# Patient Record
Sex: FEMALE | Race: WHITE | Employment: FULL TIME | URBAN - METROPOLITAN AREA
[De-identification: names, ages, dates, MRNs, and addresses within clinical notes are randomized per-mention and may not be internally consistent; named-entity substitution may affect disease eponyms.]

---

## 2017-02-07 LAB
A/G RATIO: 1.8 (ref 1.1–2.2)
ALBUMIN SERPL-MCNC: 4.2 G/DL (ref 3.4–5)
ALP BLD-CCNC: 59 U/L (ref 40–129)
ALT SERPL-CCNC: 21 U/L (ref 10–40)
ANION GAP SERPL CALCULATED.3IONS-SCNC: 13 MMOL/L (ref 3–16)
AST SERPL-CCNC: 18 U/L (ref 15–37)
BASOPHILS ABSOLUTE: 0.1 K/UL (ref 0–0.2)
BASOPHILS RELATIVE PERCENT: 1.2 %
BILIRUB SERPL-MCNC: <0.2 MG/DL (ref 0–1)
BUN BLDV-MCNC: 15 MG/DL (ref 7–20)
CALCIUM SERPL-MCNC: 8.9 MG/DL (ref 8.3–10.6)
CHLORIDE BLD-SCNC: 100 MMOL/L (ref 99–110)
CO2: 26 MMOL/L (ref 21–32)
CREAT SERPL-MCNC: 0.7 MG/DL (ref 0.6–1.1)
EOSINOPHILS ABSOLUTE: 0.1 K/UL (ref 0–0.6)
EOSINOPHILS RELATIVE PERCENT: 1.5 %
GFR AFRICAN AMERICAN: >60
GFR NON-AFRICAN AMERICAN: >60
GLOBULIN: 2.3 G/DL
GLUCOSE BLD-MCNC: 85 MG/DL (ref 70–99)
HCT VFR BLD CALC: 37.2 % (ref 36–48)
HEMOGLOBIN: 12.6 G/DL (ref 12–16)
LYMPHOCYTES ABSOLUTE: 1.4 K/UL (ref 1–5.1)
LYMPHOCYTES RELATIVE PERCENT: 25 %
MCH RBC QN AUTO: 30.9 PG (ref 26–34)
MCHC RBC AUTO-ENTMCNC: 33.7 G/DL (ref 31–36)
MCV RBC AUTO: 91.6 FL (ref 80–100)
MONOCYTES ABSOLUTE: 0.3 K/UL (ref 0–1.3)
MONOCYTES RELATIVE PERCENT: 4.7 %
NEUTROPHILS ABSOLUTE: 3.8 K/UL (ref 1.7–7.7)
NEUTROPHILS RELATIVE PERCENT: 67.6 %
PDW BLD-RTO: 14.4 % (ref 12.4–15.4)
PLATELET # BLD: 148 K/UL (ref 135–450)
PMV BLD AUTO: 9.9 FL (ref 5–10.5)
POTASSIUM SERPL-SCNC: 3.9 MMOL/L (ref 3.5–5.1)
RBC # BLD: 4.06 M/UL (ref 4–5.2)
SODIUM BLD-SCNC: 139 MMOL/L (ref 136–145)
TOTAL PROTEIN: 6.5 G/DL (ref 6.4–8.2)
WBC # BLD: 5.7 K/UL (ref 4–11)

## 2017-03-07 ENCOUNTER — OFFICE VISIT (OUTPATIENT)
Dept: RHEUMATOLOGY | Age: 22
End: 2017-03-07

## 2017-03-07 VITALS
HEART RATE: 86 BPM | DIASTOLIC BLOOD PRESSURE: 70 MMHG | BODY MASS INDEX: 25.54 KG/M2 | TEMPERATURE: 98 F | WEIGHT: 168 LBS | SYSTOLIC BLOOD PRESSURE: 124 MMHG

## 2017-03-07 DIAGNOSIS — R12 HEART BURN: ICD-10-CM

## 2017-03-07 DIAGNOSIS — L40.50 PSORIATIC ARTHRITIS (HCC): Primary | ICD-10-CM

## 2017-03-07 DIAGNOSIS — L40.9 PSORIASIS: ICD-10-CM

## 2017-03-07 DIAGNOSIS — K21.9 GASTROESOPHAGEAL REFLUX DISEASE, ESOPHAGITIS PRESENCE NOT SPECIFIED: Primary | ICD-10-CM

## 2017-03-07 DIAGNOSIS — Z51.81 ENCOUNTER FOR METHOTREXATE MONITORING: ICD-10-CM

## 2017-03-07 DIAGNOSIS — R76.8 POSITIVE ANA (ANTINUCLEAR ANTIBODY): ICD-10-CM

## 2017-03-07 DIAGNOSIS — Z79.899 ENCOUNTER FOR METHOTREXATE MONITORING: ICD-10-CM

## 2017-03-07 DIAGNOSIS — F17.200 SMOKING: ICD-10-CM

## 2017-03-07 PROCEDURE — 99214 OFFICE O/P EST MOD 30 MIN: CPT | Performed by: INTERNAL MEDICINE

## 2017-03-07 RX ORDER — OMEPRAZOLE 20 MG/1
20 CAPSULE, DELAYED RELEASE ORAL DAILY
Qty: 30 CAPSULE | Refills: 3 | Status: SHIPPED | OUTPATIENT
Start: 2017-03-07 | End: 2021-06-18

## 2017-03-07 RX ORDER — OMEPRAZOLE 20 MG/1
20 CAPSULE, DELAYED RELEASE ORAL DAILY
Qty: 30 CAPSULE | Refills: 5 | Status: SHIPPED | OUTPATIENT
Start: 2017-03-07 | End: 2017-08-17 | Stop reason: SDUPTHER

## 2017-04-04 DIAGNOSIS — L40.50 PSORIATIC ARTHRITIS (HCC): ICD-10-CM

## 2017-04-27 DIAGNOSIS — L40.50 PSORIATIC ARTHRITIS (HCC): ICD-10-CM

## 2017-06-19 DIAGNOSIS — Z51.81 ENCOUNTER FOR METHOTREXATE MONITORING: ICD-10-CM

## 2017-06-19 DIAGNOSIS — L40.50 PSORIATIC ARTHRITIS (HCC): ICD-10-CM

## 2017-06-19 DIAGNOSIS — Z79.631 ENCOUNTER FOR METHOTREXATE MONITORING: ICD-10-CM

## 2017-06-19 LAB
A/G RATIO: 1.6 (ref 1.1–2.2)
ALBUMIN SERPL-MCNC: 4.3 G/DL (ref 3.4–5)
ALP BLD-CCNC: 71 U/L (ref 40–129)
ALT SERPL-CCNC: 15 U/L (ref 10–40)
ANION GAP SERPL CALCULATED.3IONS-SCNC: 13 MMOL/L (ref 3–16)
AST SERPL-CCNC: 17 U/L (ref 15–37)
BASOPHILS ABSOLUTE: 0 K/UL (ref 0–0.2)
BASOPHILS RELATIVE PERCENT: 0.5 %
BILIRUB SERPL-MCNC: 0.3 MG/DL (ref 0–1)
BUN BLDV-MCNC: 15 MG/DL (ref 7–20)
CALCIUM SERPL-MCNC: 9.1 MG/DL (ref 8.3–10.6)
CHLORIDE BLD-SCNC: 98 MMOL/L (ref 99–110)
CO2: 24 MMOL/L (ref 21–32)
CREAT SERPL-MCNC: 0.7 MG/DL (ref 0.6–1.1)
EOSINOPHILS ABSOLUTE: 0.1 K/UL (ref 0–0.6)
EOSINOPHILS RELATIVE PERCENT: 1.5 %
GFR AFRICAN AMERICAN: >60
GFR NON-AFRICAN AMERICAN: >60
GLOBULIN: 2.7 G/DL
GLUCOSE BLD-MCNC: 69 MG/DL (ref 70–99)
HCT VFR BLD CALC: 37.7 % (ref 36–48)
HEMOGLOBIN: 12.5 G/DL (ref 12–16)
LYMPHOCYTES ABSOLUTE: 1.6 K/UL (ref 1–5.1)
LYMPHOCYTES RELATIVE PERCENT: 28.3 %
MCH RBC QN AUTO: 30.4 PG (ref 26–34)
MCHC RBC AUTO-ENTMCNC: 33 G/DL (ref 31–36)
MCV RBC AUTO: 92 FL (ref 80–100)
MONOCYTES ABSOLUTE: 0.3 K/UL (ref 0–1.3)
MONOCYTES RELATIVE PERCENT: 4.5 %
NEUTROPHILS ABSOLUTE: 3.7 K/UL (ref 1.7–7.7)
NEUTROPHILS RELATIVE PERCENT: 65.2 %
PDW BLD-RTO: 14.3 % (ref 12.4–15.4)
PLATELET # BLD: 152 K/UL (ref 135–450)
PMV BLD AUTO: 9.6 FL (ref 5–10.5)
POTASSIUM SERPL-SCNC: 4.2 MMOL/L (ref 3.5–5.1)
RBC # BLD: 4.1 M/UL (ref 4–5.2)
SODIUM BLD-SCNC: 135 MMOL/L (ref 136–145)
TOTAL PROTEIN: 7 G/DL (ref 6.4–8.2)
WBC # BLD: 5.7 K/UL (ref 4–11)

## 2017-08-17 ENCOUNTER — OFFICE VISIT (OUTPATIENT)
Dept: RHEUMATOLOGY | Age: 22
End: 2017-08-17

## 2017-08-17 VITALS
DIASTOLIC BLOOD PRESSURE: 68 MMHG | WEIGHT: 159.8 LBS | HEIGHT: 68 IN | SYSTOLIC BLOOD PRESSURE: 107 MMHG | BODY MASS INDEX: 24.22 KG/M2 | OXYGEN SATURATION: 98 % | HEART RATE: 92 BPM

## 2017-08-17 DIAGNOSIS — R12 HEART BURN: ICD-10-CM

## 2017-08-17 DIAGNOSIS — R76.8 POSITIVE ANA (ANTINUCLEAR ANTIBODY): ICD-10-CM

## 2017-08-17 DIAGNOSIS — Z51.81 ENCOUNTER FOR METHOTREXATE MONITORING: ICD-10-CM

## 2017-08-17 DIAGNOSIS — L40.9 PSORIASIS: ICD-10-CM

## 2017-08-17 DIAGNOSIS — Z79.899 ENCOUNTER FOR METHOTREXATE MONITORING: ICD-10-CM

## 2017-08-17 DIAGNOSIS — Z79.631 ENCOUNTER FOR METHOTREXATE MONITORING: ICD-10-CM

## 2017-08-17 DIAGNOSIS — L40.50 PSORIATIC ARTHRITIS (HCC): Primary | ICD-10-CM

## 2017-08-17 LAB
A/G RATIO: 1.8 (ref 1.1–2.2)
ALBUMIN SERPL-MCNC: 4.6 G/DL (ref 3.4–5)
ALP BLD-CCNC: 76 U/L (ref 40–129)
ALT SERPL-CCNC: 37 U/L (ref 10–40)
ANION GAP SERPL CALCULATED.3IONS-SCNC: 17 MMOL/L (ref 3–16)
AST SERPL-CCNC: 30 U/L (ref 15–37)
BASOPHILS ABSOLUTE: 0 K/UL (ref 0–0.2)
BASOPHILS RELATIVE PERCENT: 0.7 %
BILIRUB SERPL-MCNC: 0.3 MG/DL (ref 0–1)
BUN BLDV-MCNC: 9 MG/DL (ref 7–20)
CALCIUM SERPL-MCNC: 9.2 MG/DL (ref 8.3–10.6)
CHLORIDE BLD-SCNC: 100 MMOL/L (ref 99–110)
CO2: 25 MMOL/L (ref 21–32)
CREAT SERPL-MCNC: 0.8 MG/DL (ref 0.6–1.1)
EOSINOPHILS ABSOLUTE: 0.2 K/UL (ref 0–0.6)
EOSINOPHILS RELATIVE PERCENT: 3 %
GFR AFRICAN AMERICAN: >60
GFR NON-AFRICAN AMERICAN: >60
GLOBULIN: 2.6 G/DL
GLUCOSE BLD-MCNC: 78 MG/DL (ref 70–99)
HCT VFR BLD CALC: 41.9 % (ref 36–48)
HEMOGLOBIN: 14.1 G/DL (ref 12–16)
LYMPHOCYTES ABSOLUTE: 1.4 K/UL (ref 1–5.1)
LYMPHOCYTES RELATIVE PERCENT: 22.1 %
MCH RBC QN AUTO: 30.6 PG (ref 26–34)
MCHC RBC AUTO-ENTMCNC: 33.8 G/DL (ref 31–36)
MCV RBC AUTO: 90.7 FL (ref 80–100)
MONOCYTES ABSOLUTE: 0.3 K/UL (ref 0–1.3)
MONOCYTES RELATIVE PERCENT: 4.7 %
NEUTROPHILS ABSOLUTE: 4.3 K/UL (ref 1.7–7.7)
NEUTROPHILS RELATIVE PERCENT: 69.5 %
PDW BLD-RTO: 13.5 % (ref 12.4–15.4)
PLATELET # BLD: 147 K/UL (ref 135–450)
PMV BLD AUTO: 9.6 FL (ref 5–10.5)
POTASSIUM SERPL-SCNC: 4.7 MMOL/L (ref 3.5–5.1)
RBC # BLD: 4.62 M/UL (ref 4–5.2)
SODIUM BLD-SCNC: 142 MMOL/L (ref 136–145)
TOTAL PROTEIN: 7.2 G/DL (ref 6.4–8.2)
WBC # BLD: 6.2 K/UL (ref 4–11)

## 2017-08-17 PROCEDURE — 99214 OFFICE O/P EST MOD 30 MIN: CPT | Performed by: INTERNAL MEDICINE

## 2017-08-17 RX ORDER — FOLIC ACID 1 MG/1
1 TABLET ORAL DAILY
Qty: 30 TABLET | Refills: 5 | Status: SHIPPED | OUTPATIENT
Start: 2017-08-17 | End: 2021-06-18

## 2017-08-17 RX ORDER — FOLIC ACID 1 MG/1
1 TABLET ORAL DAILY
Qty: 30 TABLET | Refills: 5 | Status: SHIPPED | OUTPATIENT
Start: 2017-08-17 | End: 2017-08-17 | Stop reason: SDUPTHER

## 2017-08-17 RX ORDER — OMEPRAZOLE 20 MG/1
20 CAPSULE, DELAYED RELEASE ORAL DAILY
Qty: 30 CAPSULE | Refills: 5 | Status: SHIPPED | OUTPATIENT
Start: 2017-08-17 | End: 2017-08-17 | Stop reason: SDUPTHER

## 2017-08-17 RX ORDER — OMEPRAZOLE 20 MG/1
20 CAPSULE, DELAYED RELEASE ORAL DAILY
Qty: 30 CAPSULE | Refills: 5 | Status: SHIPPED | OUTPATIENT
Start: 2017-08-17 | End: 2018-01-24 | Stop reason: SDUPTHER

## 2017-11-07 ENCOUNTER — TELEPHONE (OUTPATIENT)
Dept: INTERNAL MEDICINE CLINIC | Age: 22
End: 2017-11-07

## 2017-11-07 DIAGNOSIS — L40.50 PSORIATIC ARTHRITIS (HCC): ICD-10-CM

## 2017-11-22 ENCOUNTER — OFFICE VISIT (OUTPATIENT)
Dept: RHEUMATOLOGY | Age: 22
End: 2017-11-22

## 2017-11-22 VITALS
DIASTOLIC BLOOD PRESSURE: 70 MMHG | WEIGHT: 170.2 LBS | HEIGHT: 68 IN | BODY MASS INDEX: 25.79 KG/M2 | SYSTOLIC BLOOD PRESSURE: 102 MMHG

## 2017-11-22 DIAGNOSIS — L40.50 PSORIATIC ARTHRITIS (HCC): Primary | ICD-10-CM

## 2017-11-22 DIAGNOSIS — Z79.899 HIGH RISK MEDICATION USE: ICD-10-CM

## 2017-11-22 DIAGNOSIS — R76.8 POSITIVE ANA (ANTINUCLEAR ANTIBODY): ICD-10-CM

## 2017-11-22 DIAGNOSIS — L40.9 PSORIASIS: ICD-10-CM

## 2017-11-22 LAB
ALT SERPL-CCNC: 17 U/L (ref 10–40)
AST SERPL-CCNC: 18 U/L (ref 15–37)
BASOPHILS ABSOLUTE: 0 K/UL (ref 0–0.2)
BASOPHILS RELATIVE PERCENT: 0.6 %
CREAT SERPL-MCNC: 0.7 MG/DL (ref 0.6–1.1)
EOSINOPHILS ABSOLUTE: 0.1 K/UL (ref 0–0.6)
EOSINOPHILS RELATIVE PERCENT: 2.1 %
GFR AFRICAN AMERICAN: >60
GFR NON-AFRICAN AMERICAN: >60
HCT VFR BLD CALC: 37.4 % (ref 36–48)
HEMOGLOBIN: 12.7 G/DL (ref 12–16)
LYMPHOCYTES ABSOLUTE: 1 K/UL (ref 1–5.1)
LYMPHOCYTES RELATIVE PERCENT: 25.5 %
MCH RBC QN AUTO: 31 PG (ref 26–34)
MCHC RBC AUTO-ENTMCNC: 33.9 G/DL (ref 31–36)
MCV RBC AUTO: 91.5 FL (ref 80–100)
MONOCYTES ABSOLUTE: 0.2 K/UL (ref 0–1.3)
MONOCYTES RELATIVE PERCENT: 5 %
NEUTROPHILS ABSOLUTE: 2.5 K/UL (ref 1.7–7.7)
NEUTROPHILS RELATIVE PERCENT: 66.8 %
PDW BLD-RTO: 14.9 % (ref 12.4–15.4)
PLATELET # BLD: 159 K/UL (ref 135–450)
PMV BLD AUTO: 9.5 FL (ref 5–10.5)
RBC # BLD: 4.08 M/UL (ref 4–5.2)
WBC # BLD: 3.8 K/UL (ref 4–11)

## 2017-11-22 PROCEDURE — 99214 OFFICE O/P EST MOD 30 MIN: CPT | Performed by: INTERNAL MEDICINE

## 2017-11-22 NOTE — PROGRESS NOTES
2017  Patient Name: Erica Kamara  : 1995  Medical Record: [id number]    MEDICATIONS  Current Outpatient Prescriptions   Medication Sig Dispense Refill    methotrexate (RHEUMATREX) 2.5 MG chemo tablet Take 8 tablets by mouth every 7 days 96 tablet 1    folic acid (FOLVITE) 1 MG tablet Take 1 tablet by mouth daily 30 tablet 5    omeprazole (PRILOSEC) 20 MG delayed release capsule Take 1 capsule by mouth Daily 30 capsule 5    Melatonin 1 MG CAPS Take by mouth      norethindrone-ethinyl estradiol (JUNEL ) 1-20 MG-MCG per tablet Take 1 tablet by mouth daily      prazosin (MINIPRESS) 1 MG capsule Take 2 mg by mouth three times daily       OXcarbazepine (TRILEPTAL) 300 MG tablet Take 300 mg by mouth 2 times daily      traZODone (DESYREL) 50 MG tablet Take 50 mg by mouth nightly       No current facility-administered medications for this visit. ALLERGIES  Allergies   Allergen Reactions    Diflucan [Fluconazole] Other (See Comments)     Mouth lesions in and around mouth         Comments  No specialty comments available. Background history:  Erica Kamara is a 23 y.o. female who is being seen for follow up evaluation of  psoriatic arthritis. The patient complains of bilateral foot pain for 1 year. Patient reports: Pain: none  Description: moderate  Nature: sharp  Location: heel  Radiates: distally and proximally   Exacerbating factors: weight bearing, walking  and climbing stairs  Night pain: occasional  Rest pain:  occasional  Onset: sudden  Alleviating factors: OTC NSAIDS (naproxen which helps)  Associated symptoms: swelling in the ankle and in the midfoot   Neurological complaints: none  Vascular complaints:  dependent swelling  Ambulation:  mild difficulty with walking   Previous treatments: steroid injection for plantar fascitis for right foot 7 months ago which did help a lot. She has psoriasis for past 1 year and was given topicals which have not worked.  She has pain at the achilles tendon site, occasional swelling as well. Interim history: She presents for a follow-up of psoriatic arthritis. She is on methotrexate 8 tablets per week and doing well. She gets occasional achiness in the joints towards end of the day after standing on her feet for 8 hours. She denies any swelling or stiffness. Symptoms are worse in her lower back. She denies any side effects with the medications. Psoriatic rash is improved    She denies dactylitis, uveitis. No recent fevers or infections. No malar rash, photosensitivity, oral/nasal ulcers, alopecia, pleuritis/pericarditis      HPI  ROS      REVIEW OF SYSTEMS: Positive for heart burn, dry eyes   Constitutional: No unanticipated weight loss or fevers. No fatigue and malaise. Integumentary: No photosensitivity, livedo reticularis, alopecia and Raynaud's symptoms, sclerodactyly, skin tightening  Eyes: negative for dry eyes, visual disturbance and persistent redness, discharge from eyes   ENT: - No tinnitus, loss of hearing, vertigo, or recurrent ear infections.  - No history of nasal/oral ulcers. - No history of sicca symptoms. Cardiovascular: No history of pericarditis, chest pain or murmur or palpitations  Respiratory: No shortness of breath, cough or history of interstitial lung disease. No history of pleurisy. No history of tuberculosis or atypical infections. Gastrointestinal: No history of heart burn, dysphagia or esophageal dysmotility. No change in bowel habits or any inflammatory bowel disease. Genitourinary: No history renal disease, miscarriages. Hematologic/Lymphatic: No abnormal bruising or bleeding, blood clots or swollen lymph nodes. Musculoskeletal:   Neurological: No history seizure or focal weakness.  No history of neuropathies, paresthesias or hyperesthesias, facial droop, diplopia  Psychiatric: History of bipolar disease   Endocrine: Denies any thyroid / parathyroid disease and osteoporosis  Allergic/Immunologic: No nasal congestion or hives. I have reviewed patients Past medical History, Social History and Family History as mentioned in her chart and this remains unchanged from previous.     Past Medical History:   Diagnosis Date    Anxiety     Arthritis 2015    psoariatic    Depression     Former smoker 5-1-12    GERD (gastroesophageal reflux disease)     Migraine     Onychomycosis      Past Surgical History:   Procedure Laterality Date    COSMETIC SURGERY      Bilateral Ears    WISDOM TOOTH EXTRACTION       Social History     Social History    Marital status: Single     Spouse name: N/A    Number of children: 0    Years of education: N/A     Occupational History    [employer]/student      Social History Main Topics    Smoking status: Former Smoker     Packs/day: 0.25     Types: Cigarettes     Quit date: 5/1/2012    Smokeless tobacco: Never Used      Comment: discussed stopping  does not smoke daily    Alcohol use 0.0 oz/week      Comment: Occasional    Drug use: No    Sexual activity: Not on file     Other Topics Concern    Not on file     Social History Narrative    No narrative on file     Family History   Problem Relation Age of Onset    High Blood Pressure Mother     Asthma Brother     Stroke Paternal Uncle     Cancer Maternal Grandmother     Cancer Maternal Grandfather     Diabetes Paternal Grandfather          PHYSICAL EXAM   Vitals:    11/22/17 1108   BP: 102/70   Weight: 170 lb 3.2 oz (77.2 kg)   Height: 5' 8\" (1.727 m)     Physical Exam  Constitutional:  Well developed, well nourished, no acute distress, non-toxic appearance   Musculoskeletal:     28 Joint Count                                 Right                                Left                                   Swell  Tender  Swell  Tender    PIP1  0 0 0  0   PIP2  0 0 0 0   PIP3  0 0 0 0   PIP4  0 0 0 0   PIP5  0 0 0 0   MCP1  0 0 0 0   MCP2  0 0 0 0   MCP3  0 0 0 0   MCP4  0 0 0 0   MCP5  0 0 0 0   Wrist  0 0 0 0   Elbow  0 0 0 0

## 2018-01-24 DIAGNOSIS — R12 HEART BURN: ICD-10-CM

## 2018-01-24 RX ORDER — OMEPRAZOLE 20 MG/1
20 CAPSULE, DELAYED RELEASE ORAL DAILY
Qty: 90 CAPSULE | Refills: 1 | Status: SHIPPED | OUTPATIENT
Start: 2018-01-24 | End: 2021-06-17 | Stop reason: SDUPTHER

## 2018-03-29 DIAGNOSIS — Z79.899 HIGH RISK MEDICATION USE: ICD-10-CM

## 2018-03-29 LAB
ALT SERPL-CCNC: 17 U/L (ref 10–40)
AST SERPL-CCNC: 19 U/L (ref 15–37)
BASOPHILS ABSOLUTE: 0 K/UL (ref 0–0.2)
BASOPHILS RELATIVE PERCENT: 0.6 %
CREAT SERPL-MCNC: 0.7 MG/DL (ref 0.6–1.1)
EOSINOPHILS ABSOLUTE: 0.1 K/UL (ref 0–0.6)
EOSINOPHILS RELATIVE PERCENT: 1.6 %
GFR AFRICAN AMERICAN: >60
GFR NON-AFRICAN AMERICAN: >60
HCT VFR BLD CALC: 39 % (ref 36–48)
HEMOGLOBIN: 13.4 G/DL (ref 12–16)
LYMPHOCYTES ABSOLUTE: 1.2 K/UL (ref 1–5.1)
LYMPHOCYTES RELATIVE PERCENT: 25.3 %
MCH RBC QN AUTO: 30.2 PG (ref 26–34)
MCHC RBC AUTO-ENTMCNC: 34.3 G/DL (ref 31–36)
MCV RBC AUTO: 88 FL (ref 80–100)
MONOCYTES ABSOLUTE: 0.2 K/UL (ref 0–1.3)
MONOCYTES RELATIVE PERCENT: 4.1 %
NEUTROPHILS ABSOLUTE: 3.1 K/UL (ref 1.7–7.7)
NEUTROPHILS RELATIVE PERCENT: 68.4 %
PDW BLD-RTO: 14.3 % (ref 12.4–15.4)
PLATELET # BLD: 174 K/UL (ref 135–450)
PMV BLD AUTO: 9 FL (ref 5–10.5)
RBC # BLD: 4.43 M/UL (ref 4–5.2)
WBC # BLD: 4.6 K/UL (ref 4–11)

## 2018-05-07 ENCOUNTER — TELEPHONE (OUTPATIENT)
Dept: INTERNAL MEDICINE CLINIC | Age: 23
End: 2018-05-07

## 2018-05-08 ENCOUNTER — OFFICE VISIT (OUTPATIENT)
Dept: INTERNAL MEDICINE CLINIC | Age: 23
End: 2018-05-08

## 2018-05-08 VITALS
HEART RATE: 84 BPM | DIASTOLIC BLOOD PRESSURE: 78 MMHG | BODY MASS INDEX: 27.55 KG/M2 | WEIGHT: 181.8 LBS | OXYGEN SATURATION: 99 % | SYSTOLIC BLOOD PRESSURE: 116 MMHG | HEIGHT: 68 IN

## 2018-05-08 DIAGNOSIS — L40.9 PSORIASIS: ICD-10-CM

## 2018-05-08 DIAGNOSIS — K21.9 GASTROESOPHAGEAL REFLUX DISEASE WITHOUT ESOPHAGITIS: ICD-10-CM

## 2018-05-08 DIAGNOSIS — L40.50 PSORIATIC ARTHRITIS (HCC): Primary | ICD-10-CM

## 2018-05-08 PROCEDURE — 99213 OFFICE O/P EST LOW 20 MIN: CPT | Performed by: INTERNAL MEDICINE

## 2018-05-08 ASSESSMENT — PATIENT HEALTH QUESTIONNAIRE - PHQ9
1. LITTLE INTEREST OR PLEASURE IN DOING THINGS: 0
SUM OF ALL RESPONSES TO PHQ QUESTIONS 1-9: 0
SUM OF ALL RESPONSES TO PHQ9 QUESTIONS 1 & 2: 0
2. FEELING DOWN, DEPRESSED OR HOPELESS: 0

## 2018-05-08 ASSESSMENT — ENCOUNTER SYMPTOMS
GASTROINTESTINAL NEGATIVE: 1
RESPIRATORY NEGATIVE: 1
EYES NEGATIVE: 1

## 2018-06-08 ENCOUNTER — TELEPHONE (OUTPATIENT)
Dept: INTERNAL MEDICINE CLINIC | Age: 23
End: 2018-06-08

## 2018-06-08 DIAGNOSIS — L40.50 PSORIATIC ARTHRITIS (HCC): ICD-10-CM

## 2021-06-17 PROBLEM — M54.50 CHRONIC MIDLINE LOW BACK PAIN WITHOUT SCIATICA: Status: ACTIVE | Noted: 2018-07-31

## 2021-06-17 PROBLEM — B97.7 HPV (HUMAN PAPILLOMA VIRUS) INFECTION: Status: ACTIVE | Noted: 2021-06-17

## 2021-06-17 PROBLEM — G89.29 CHRONIC MIDLINE LOW BACK PAIN WITHOUT SCIATICA: Status: ACTIVE | Noted: 2018-07-31

## 2021-06-17 PROBLEM — F43.10 PTSD (POST-TRAUMATIC STRESS DISORDER): Status: ACTIVE | Noted: 2021-06-17

## 2021-06-18 DIAGNOSIS — R00.2 PALPITATIONS: ICD-10-CM

## 2021-06-18 LAB
A/G RATIO: 1.8 (ref 1.1–2.2)
ALBUMIN SERPL-MCNC: 4.6 G/DL (ref 3.4–5)
ALP BLD-CCNC: 56 U/L (ref 40–129)
ALT SERPL-CCNC: 18 U/L (ref 10–40)
ANION GAP SERPL CALCULATED.3IONS-SCNC: 9 MMOL/L (ref 3–16)
AST SERPL-CCNC: 25 U/L (ref 15–37)
BILIRUB SERPL-MCNC: 0.4 MG/DL (ref 0–1)
BUN BLDV-MCNC: 5 MG/DL (ref 7–20)
CALCIUM SERPL-MCNC: 9.2 MG/DL (ref 8.3–10.6)
CHLORIDE BLD-SCNC: 90 MMOL/L (ref 99–110)
CO2: 28 MMOL/L (ref 21–32)
CREAT SERPL-MCNC: 0.6 MG/DL (ref 0.6–1.1)
GFR AFRICAN AMERICAN: >60
GFR NON-AFRICAN AMERICAN: >60
GLOBULIN: 2.5 G/DL
GLUCOSE BLD-MCNC: 80 MG/DL (ref 70–99)
POTASSIUM SERPL-SCNC: 4.4 MMOL/L (ref 3.5–5.1)
SODIUM BLD-SCNC: 127 MMOL/L (ref 136–145)
TOTAL PROTEIN: 7.1 G/DL (ref 6.4–8.2)
TSH REFLEX: 1.41 UIU/ML (ref 0.27–4.2)

## 2021-06-22 PROCEDURE — 93242 EXT ECG>48HR<7D RECORDING: CPT | Performed by: INTERNAL MEDICINE

## 2021-06-23 LAB
METANEPH/PLASMA INTERP: NORMAL
METANEPHRINE FREE PLASMA: <0.1 NMOL/L (ref 0–0.49)
NORMETANEPHRINE FREE PLASMA: 0.53 NMOL/L (ref 0–0.89)

## 2021-06-28 ENCOUNTER — TELEPHONE (OUTPATIENT)
Dept: CARDIOLOGY CLINIC | Age: 26
End: 2021-06-28

## 2021-06-28 DIAGNOSIS — R00.2 PALPITATIONS: ICD-10-CM

## 2021-06-28 PROCEDURE — 93244 EXT ECG>48HR<7D REV&INTERPJ: CPT | Performed by: INTERNAL MEDICINE

## 2021-07-02 DIAGNOSIS — R00.2 HEART PALPITATIONS: ICD-10-CM

## 2021-07-02 DIAGNOSIS — E87.1 HYPONATREMIA: ICD-10-CM

## 2021-07-02 LAB
ANION GAP SERPL CALCULATED.3IONS-SCNC: 12 MMOL/L (ref 3–16)
BUN BLDV-MCNC: 8 MG/DL (ref 7–20)
CALCIUM SERPL-MCNC: 9 MG/DL (ref 8.3–10.6)
CHLORIDE BLD-SCNC: 97 MMOL/L (ref 99–110)
CO2: 23 MMOL/L (ref 21–32)
CREAT SERPL-MCNC: 0.6 MG/DL (ref 0.6–1.1)
GFR AFRICAN AMERICAN: >60
GFR NON-AFRICAN AMERICAN: >60
GLUCOSE BLD-MCNC: 83 MG/DL (ref 70–99)
POTASSIUM SERPL-SCNC: 4.9 MMOL/L (ref 3.5–5.1)
SODIUM BLD-SCNC: 132 MMOL/L (ref 136–145)

## 2021-07-26 ENCOUNTER — OFFICE VISIT (OUTPATIENT)
Dept: CARDIOLOGY CLINIC | Age: 26
End: 2021-07-26
Payer: COMMERCIAL

## 2021-07-26 VITALS
HEART RATE: 87 BPM | DIASTOLIC BLOOD PRESSURE: 70 MMHG | OXYGEN SATURATION: 98 % | BODY MASS INDEX: 26.43 KG/M2 | SYSTOLIC BLOOD PRESSURE: 114 MMHG | HEIGHT: 68 IN | WEIGHT: 174.4 LBS

## 2021-07-26 DIAGNOSIS — R00.2 PALPITATIONS: Primary | ICD-10-CM

## 2021-07-26 PROCEDURE — 99203 OFFICE O/P NEW LOW 30 MIN: CPT | Performed by: INTERNAL MEDICINE

## 2021-07-26 NOTE — PATIENT INSTRUCTIONS

## 2021-07-26 NOTE — PROGRESS NOTES
Cardiology Consultation   Referring Physician: Claudene Lux, MD  Reason for Consultation: Palpitation  Chief Complaint:   Chief Complaint   Patient presents with    Follow-up     no cc         Subjective:   History of Present Illness:  Po Edmonds is a 27 y.o. female with a past medical history significant for anxiety. Today, she is here as a new patient for palpitations. She states that at the beginning of the year she had some high blood pressure but she was under a lot of stress at that time. Reports that her blood pressure has been okay since. She did go see her PCP and a 24 hour monitor was ordered at that time. Patient denies exertional chest pain/pressure, dyspnea at rest, FOX, PND, orthopnea, lightheadedness, weight changes, changes in LE edema, and syncope. Prior cardiac testing:    [id number] Normal sinus rhythm    Echo:     Stress Test:     Cath:     24 Hour Monitor 2021   Normal      Past Medical History:   has a past medical history of Anxiety, Arthritis, Depression, Former smoker, GERD (gastroesophageal reflux disease), Migraine, and Onychomycosis. Surgical History:   has a past surgical history that includes [place] tooth extraction and Cosmetic surgery. Social History:   reports that she quit smoking about 9 years ago. Her smoking use included cigarettes. She smoked 0.25 packs per day. She has never used smokeless tobacco. She reports current alcohol use. She reports that she does not use drugs. Family History:  family history includes Asthma in her brother; Cancer in her maternal grandfather and maternal grandmother; Diabetes in her paternal grandfather; High Blood Pressure in her mother; Stroke in her paternal uncle.     Home Medications:  Were reviewed and are listed in nursing record and/or below  Prior to Admission medications    Medication Sig Start Date End Date Taking? Authorizing Provider   omeprazole (PRILOSEC) 20 MG delayed release capsule Take 20 mg by mouth daily   Yes Historical Provider, MD   busPIRone (BUSPAR) 5 MG tablet Take 5 mg by mouth 3 times daily   Yes Historical Provider, MD   norgestrel-ethinyl estradiol (LOW-OGESTREL) 0.3-30 MG-MCG per tablet Take 1 tablet by mouth daily 6/1/21  Yes Historical Provider, MD   sulfaSALAzine (AZULFIDINE) 500 MG tablet Take 1,000 mg by mouth 2 times daily 5/3/21  Yes Historical Provider, MD   Multiple Vitamin (MULTIVITAMIN) tablet Take 1 tablet by mouth daily   Yes Historical Provider, MD   ascorbic acid (VITAMIN C) 250 MG tablet Take 250 mg by mouth 2 times daily    Yes Historical Provider, MD   cyanocobalamin 100 MCG tablet Take 1 tablet by mouth daily   Yes Historical Provider, MD   Adalimumab (HUMIRA PEN) 40 MG/0.4ML PNKT Inject 40 mg into the skin every 14 days 5/3/21  Yes Historical Provider, MD   Coenzyme Q10 (COQ-10) 100 MG CAPS Take by mouth   Yes Historical Provider, MD   OXcarbazepine (TRILEPTAL) 300 MG tablet Take 300 mg by mouth 3 times daily    Yes Historical Provider, MD          Allergies:  Diflucan [fluconazole]     Review of Systems:   · Constitutional: no unanticipated weight loss. There's been no change in energy level, sleep pattern, or activity level. No fevers, chills. · Eyes: No visual changes or diplopia. No scleral icterus. · ENT: No Headaches, hearing loss or vertigo. No mouth sores or sore throat. · Cardiovascular: as reviewed in HPI  · Respiratory: No cough or wheezing, no sputum production. No hemoptysis. · Gastrointestinal: No abdominal pain, appetite loss, blood in stools. No change in bowel or bladder habits. · Genitourinary: No dysuria, trouble voiding, or hematuria. · Musculoskeletal:  No gait disturbance, no joint complaints. · Integumentary: No rash or pruritis. · Neurological: No headache, diplopia, change in muscle strength, numbness or tingling. · Psychiatric: No anxiety or depression. · Endocrine: No temperature intolerance. No excessive thirst, fluid intake, or urination. No tremor. · Hematologic/Lymphatic: No abnormal bruising or bleeding, blood clots or swollen lymph nodes. · Allergic/Immunologic: No nasal congestion or hives. Objective:   PHYSICAL EXAM:    Vitals:    07/26/21 1105   BP: 114/70   Pulse: 87   SpO2: 98%    Weight: 174 lb 6.4 oz (79.1 kg)       General Appearance:  Alert, cooperative, no distress, appears stated age. Head:  Normocephalic, without obvious abnormality, atraumatic. Eyes:  Pupils equal and round. No scleral icterus. Mouth: Moist mucosa, no pharyngeal erythema. Nose: Nares normal. No drainage or sinus tenderness. Neck: Supple, symmetrical, trachea midline. No adenopathy. No tenderness/mass/nodules. No carotid bruit or elevated JVD. Lungs:   Clear to auscultation bilaterally, respirations unlabored. No wheeze, rales, or rhonchi. Chest Wall:  No tenderness or deformity. Heart:  Regular rate. S1/S2 normal. No murmur, rub, or gallop. Abdomen:   Soft, non-tender, bowel sounds active. Musculoskeletal: No muscle wasting or digital clubbing. Extremities: Extremities normal, atraumatic. No cyanosis or edema. Pulses: 2+ radial and carotid pulses, symmetric. Skin: No rashes or lesions. Pysch: Normal mood and affect. Alert and oriented x 4.    Neurologic: Normal gross motor and sensory exam.       Labs     CBC:   Lab Results   Component Value Date    WBC 4.6 03/29/2018    RBC 4.43 03/29/2018    HGB 13.4 03/29/2018    HCT 39.0 03/29/2018    MCV 88.0 03/29/2018    RDW 14.3 03/29/2018     03/29/2018     CMP:  Lab Results   Component Value Date     07/02/2021    K 4.9 07/02/2021    CL 97 07/02/2021    CO2 23 07/02/2021    BUN 8 07/02/2021    CREATININE 0.6 07/02/2021    GFRAA >60 07/02/2021    GFRAA >60 07/06/2012    AGRATIO 1.8 06/18/2021    LABGLOM >60 07/02/2021    GLUCOSE 83 07/02/2021 PROT 7.1 06/18/2021    PROT 6.9 07/19/2012    CALCIUM 9.0 07/02/2021    BILITOT 0.4 06/18/2021    ALKPHOS 56 06/18/2021    AST 25 06/18/2021    ALT 18 06/18/2021     PT/INR:  No results found for: PTINR  HgBA1c:No results found for: LABA1C  Troponin: No results found for: TROPONINI      CURRENT Medications:  Current Outpatient Medications on File Prior to Visit   Medication Sig Dispense Refill    omeprazole (PRILOSEC) 20 MG delayed release capsule Take 20 mg by mouth daily      busPIRone (BUSPAR) 5 MG tablet Take 5 mg by mouth 3 times daily      norgestrel-ethinyl estradiol (LOW-OGESTREL) 0.3-30 MG-MCG per tablet Take 1 tablet by mouth daily      sulfaSALAzine (AZULFIDINE) 500 MG tablet Take 1,000 mg by mouth 2 times daily      Multiple Vitamin (MULTIVITAMIN) tablet Take 1 tablet by mouth daily      ascorbic acid (VITAMIN C) 250 MG tablet Take 250 mg by mouth 2 times daily       cyanocobalamin 100 MCG tablet Take 1 tablet by mouth daily      Adalimumab (HUMIRA PEN) 40 MG/0.4ML PNKT Inject 40 mg into the skin every 14 days      Coenzyme Q10 (COQ-10) 100 MG CAPS Take by mouth      OXcarbazepine (TRILEPTAL) 300 MG tablet Take 300 mg by mouth 3 times daily        No current facility-administered medications on file prior to visit. Assessment and Plan   1) Palpitations  Asymptomatic   24 hour monitor normal.       Follow up as needed. Thank you for allowing us to participate in the care of Eve Daniles MD [facility] [address]   (O): [phone]     This note was scribed in the presence of Dr Julianna Martin, by Rich Chávez RN    Physician Attestation:  The scribes documentation has been prepared under my direction and personally reviewed by me in its entirety. I confirm the note above accurately reflects all work, treatment, procedures, and medical decision making performed by me.     Electronically signed by Piper Mobley MD on 8/3/2021 at 9:49 PM

## 2022-08-25 PROBLEM — R21 RASH: Status: ACTIVE | Noted: 2022-08-25

## 2022-09-19 ENCOUNTER — APPOINTMENT (OUTPATIENT)
Dept: GENERAL RADIOLOGY | Age: 27
DRG: 603 | End: 2022-09-19
Payer: COMMERCIAL

## 2022-09-19 ENCOUNTER — HOSPITAL ENCOUNTER (INPATIENT)
Age: 27
LOS: 2 days | Discharge: HOME | DRG: 603 | End: 2022-09-21
Attending: INTERNAL MEDICINE | Admitting: INTERNAL MEDICINE
Payer: COMMERCIAL

## 2022-09-19 DIAGNOSIS — Z78.9 FAILURE OF OUTPATIENT TREATMENT: ICD-10-CM

## 2022-09-19 DIAGNOSIS — L03.115 CELLULITIS OF RIGHT LOWER EXTREMITY: Primary | ICD-10-CM

## 2022-09-19 DIAGNOSIS — Z22.322 MRSA (METHICILLIN RESISTANT STAPH AUREUS) CULTURE POSITIVE: ICD-10-CM

## 2022-09-19 DIAGNOSIS — L40.9 PSORIASIS: ICD-10-CM

## 2022-09-19 PROBLEM — L03.90 CELLULITIS: Status: ACTIVE | Noted: 2022-09-19

## 2022-09-19 LAB
ANION GAP SERPL CALCULATED.3IONS-SCNC: 10 MMOL/L (ref 3–16)
BASOPHILS ABSOLUTE: 0 K/UL (ref 0–0.2)
BASOPHILS RELATIVE PERCENT: 1 %
BUN BLDV-MCNC: 6 MG/DL (ref 7–20)
CALCIUM SERPL-MCNC: 8.6 MG/DL (ref 8.3–10.6)
CHLORIDE BLD-SCNC: 98 MMOL/L (ref 99–110)
CO2: 24 MMOL/L (ref 21–32)
CREAT SERPL-MCNC: 0.6 MG/DL (ref 0.6–1.1)
EOSINOPHILS ABSOLUTE: 0.3 K/UL (ref 0–0.6)
EOSINOPHILS RELATIVE PERCENT: 9.1 %
GFR AFRICAN AMERICAN: >60
GFR NON-AFRICAN AMERICAN: >60
GLUCOSE BLD-MCNC: 84 MG/DL (ref 70–99)
HCT VFR BLD CALC: 38.1 % (ref 36–48)
HEMOGLOBIN: 13 G/DL (ref 12–16)
LACTIC ACID, SEPSIS: 1.3 MMOL/L (ref 0.4–1.9)
LYMPHOCYTES ABSOLUTE: 1 K/UL (ref 1–5.1)
LYMPHOCYTES RELATIVE PERCENT: 35.3 %
MCH RBC QN AUTO: 29.7 PG (ref 26–34)
MCHC RBC AUTO-ENTMCNC: 34.1 G/DL (ref 31–36)
MCV RBC AUTO: 87.3 FL (ref 80–100)
MONOCYTES ABSOLUTE: 0.2 K/UL (ref 0–1.3)
MONOCYTES RELATIVE PERCENT: 7.8 %
NEUTROPHILS ABSOLUTE: 1.3 K/UL (ref 1.7–7.7)
NEUTROPHILS RELATIVE PERCENT: 46.8 %
PDW BLD-RTO: 14.5 % (ref 12.4–15.4)
PLATELET # BLD: 206 K/UL (ref 135–450)
PMV BLD AUTO: 8 FL (ref 5–10.5)
POTASSIUM REFLEX MAGNESIUM: 4.1 MMOL/L (ref 3.5–5.1)
RBC # BLD: 4.37 M/UL (ref 4–5.2)
SODIUM BLD-SCNC: 132 MMOL/L (ref 136–145)
WBC # BLD: 2.8 K/UL (ref 4–11)

## 2022-09-19 PROCEDURE — 85025 COMPLETE CBC W/AUTO DIFF WBC: CPT

## 2022-09-19 PROCEDURE — 80048 BASIC METABOLIC PNL TOTAL CA: CPT

## 2022-09-19 PROCEDURE — 2580000003 HC RX 258: Performed by: PHYSICIAN ASSISTANT

## 2022-09-19 PROCEDURE — 6360000002 HC RX W HCPCS: Performed by: PHYSICIAN ASSISTANT

## 2022-09-19 PROCEDURE — 99285 EMERGENCY DEPT VISIT HI MDM: CPT

## 2022-09-19 PROCEDURE — 87040 BLOOD CULTURE FOR BACTERIA: CPT

## 2022-09-19 PROCEDURE — 1200000000 HC SEMI PRIVATE

## 2022-09-19 PROCEDURE — 73630 X-RAY EXAM OF FOOT: CPT

## 2022-09-19 PROCEDURE — 96365 THER/PROPH/DIAG IV INF INIT: CPT

## 2022-09-19 PROCEDURE — 83605 ASSAY OF LACTIC ACID: CPT

## 2022-09-19 PROCEDURE — 96366 THER/PROPH/DIAG IV INF ADDON: CPT

## 2022-09-19 RX ORDER — UBIDECARENONE 75 MG
100 CAPSULE ORAL DAILY
Status: DISCONTINUED | OUTPATIENT
Start: 2022-09-20 | End: 2022-09-21 | Stop reason: HOSPADM

## 2022-09-19 RX ORDER — SODIUM CHLORIDE 0.9 % (FLUSH) 0.9 %
10 SYRINGE (ML) INJECTION PRN
Status: DISCONTINUED | OUTPATIENT
Start: 2022-09-19 | End: 2022-09-21 | Stop reason: HOSPADM

## 2022-09-19 RX ORDER — ACETAMINOPHEN 325 MG/1
650 TABLET ORAL EVERY 6 HOURS PRN
Status: DISCONTINUED | OUTPATIENT
Start: 2022-09-19 | End: 2022-09-21 | Stop reason: HOSPADM

## 2022-09-19 RX ORDER — BUSPIRONE HYDROCHLORIDE 10 MG/1
20 TABLET ORAL 3 TIMES DAILY
COMMUNITY

## 2022-09-19 RX ORDER — CETIRIZINE HYDROCHLORIDE 10 MG/1
10 TABLET ORAL DAILY
Status: DISCONTINUED | OUTPATIENT
Start: 2022-09-20 | End: 2022-09-21 | Stop reason: HOSPADM

## 2022-09-19 RX ORDER — POTASSIUM CHLORIDE 7.45 MG/ML
10 INJECTION INTRAVENOUS PRN
Status: DISCONTINUED | OUTPATIENT
Start: 2022-09-19 | End: 2022-09-21 | Stop reason: HOSPADM

## 2022-09-19 RX ORDER — SODIUM CHLORIDE 9 MG/ML
INJECTION, SOLUTION INTRAVENOUS PRN
Status: DISCONTINUED | OUTPATIENT
Start: 2022-09-19 | End: 2022-09-21 | Stop reason: HOSPADM

## 2022-09-19 RX ORDER — BUSPIRONE HYDROCHLORIDE 10 MG/1
20 TABLET ORAL 3 TIMES DAILY
Status: DISCONTINUED | OUTPATIENT
Start: 2022-09-19 | End: 2022-09-21 | Stop reason: HOSPADM

## 2022-09-19 RX ORDER — DIPHENHYDRAMINE HCL 25 MG
25 TABLET ORAL EVERY 6 HOURS PRN
Status: DISCONTINUED | OUTPATIENT
Start: 2022-09-19 | End: 2022-09-21 | Stop reason: HOSPADM

## 2022-09-19 RX ORDER — OXCARBAZEPINE 300 MG/1
300 TABLET, FILM COATED ORAL EVERY MORNING
COMMUNITY

## 2022-09-19 RX ORDER — ENOXAPARIN SODIUM 100 MG/ML
40 INJECTION SUBCUTANEOUS DAILY
Status: DISCONTINUED | OUTPATIENT
Start: 2022-09-20 | End: 2022-09-21 | Stop reason: HOSPADM

## 2022-09-19 RX ORDER — KETOROLAC TROMETHAMINE 30 MG/ML
15 INJECTION, SOLUTION INTRAMUSCULAR; INTRAVENOUS ONCE
Status: DISCONTINUED | OUTPATIENT
Start: 2022-09-19 | End: 2022-09-21 | Stop reason: HOSPADM

## 2022-09-19 RX ORDER — PROMETHAZINE HYDROCHLORIDE 25 MG/1
12.5 TABLET ORAL EVERY 6 HOURS PRN
Status: DISCONTINUED | OUTPATIENT
Start: 2022-09-19 | End: 2022-09-21 | Stop reason: HOSPADM

## 2022-09-19 RX ORDER — FEXOFENADINE HCL 180 MG/1
180 TABLET ORAL DAILY
COMMUNITY

## 2022-09-19 RX ORDER — MAGNESIUM SULFATE IN WATER 40 MG/ML
2000 INJECTION, SOLUTION INTRAVENOUS PRN
Status: DISCONTINUED | OUTPATIENT
Start: 2022-09-19 | End: 2022-09-21 | Stop reason: HOSPADM

## 2022-09-19 RX ORDER — FEXOFENADINE HCL 180 MG/1
180 TABLET ORAL DAILY
Status: DISCONTINUED | OUTPATIENT
Start: 2022-09-19 | End: 2022-09-19 | Stop reason: CLARIF

## 2022-09-19 RX ORDER — ACETAMINOPHEN 650 MG/1
650 SUPPOSITORY RECTAL EVERY 6 HOURS PRN
Status: DISCONTINUED | OUTPATIENT
Start: 2022-09-19 | End: 2022-09-21 | Stop reason: HOSPADM

## 2022-09-19 RX ORDER — OXCARBAZEPINE 300 MG/1
300 TABLET, FILM COATED ORAL EVERY MORNING
Status: DISCONTINUED | OUTPATIENT
Start: 2022-09-20 | End: 2022-09-21 | Stop reason: HOSPADM

## 2022-09-19 RX ORDER — POTASSIUM CHLORIDE 20 MEQ/1
40 TABLET, EXTENDED RELEASE ORAL PRN
Status: DISCONTINUED | OUTPATIENT
Start: 2022-09-19 | End: 2022-09-21 | Stop reason: HOSPADM

## 2022-09-19 RX ORDER — OXCARBAZEPINE 300 MG/1
1200 TABLET, FILM COATED ORAL NIGHTLY
Status: DISCONTINUED | OUTPATIENT
Start: 2022-09-19 | End: 2022-09-21 | Stop reason: HOSPADM

## 2022-09-19 RX ORDER — ONDANSETRON 2 MG/ML
4 INJECTION INTRAMUSCULAR; INTRAVENOUS EVERY 6 HOURS PRN
Status: DISCONTINUED | OUTPATIENT
Start: 2022-09-19 | End: 2022-09-21 | Stop reason: HOSPADM

## 2022-09-19 RX ORDER — OXCARBAZEPINE 300 MG/1
1200 TABLET, FILM COATED ORAL NIGHTLY
COMMUNITY

## 2022-09-19 RX ORDER — POTASSIUM CHLORIDE 7.45 MG/ML
10 INJECTION INTRAVENOUS PRN
Status: DISCONTINUED | OUTPATIENT
Start: 2022-09-19 | End: 2022-09-19

## 2022-09-19 RX ORDER — SODIUM CHLORIDE 0.9 % (FLUSH) 0.9 %
10 SYRINGE (ML) INJECTION EVERY 12 HOURS SCHEDULED
Status: DISCONTINUED | OUTPATIENT
Start: 2022-09-20 | End: 2022-09-21 | Stop reason: HOSPADM

## 2022-09-19 RX ORDER — PANTOPRAZOLE SODIUM 40 MG/1
40 TABLET, DELAYED RELEASE ORAL
Status: DISCONTINUED | OUTPATIENT
Start: 2022-09-20 | End: 2022-09-21 | Stop reason: HOSPADM

## 2022-09-19 RX ADMIN — VANCOMYCIN HYDROCHLORIDE 1500 MG: 5 INJECTION, POWDER, LYOPHILIZED, FOR SOLUTION INTRAVENOUS at 15:33

## 2022-09-19 ASSESSMENT — PAIN - FUNCTIONAL ASSESSMENT
PAIN_FUNCTIONAL_ASSESSMENT: 0-10

## 2022-09-19 ASSESSMENT — PAIN SCALES - GENERAL
PAINLEVEL_OUTOF10: 0
PAINLEVEL_OUTOF10: 2
PAINLEVEL_OUTOF10: 5
PAINLEVEL_OUTOF10: 2

## 2022-09-19 ASSESSMENT — PAIN DESCRIPTION - LOCATION: LOCATION: ANKLE

## 2022-09-19 NOTE — ED PROVIDER NOTES
[facility]        Pt Name: Juan José Voss  MRN: [id number]  [place] 1995  Date of evaluation: 9/19/2022  Provider: Teresa Deleon PA-C  PCP: Marietta Stanford MD  Note Started: 4:08 PM EDT      MAGGIE. I have evaluated this patient. My supervising physician was available for consultation. Triage CHIEF COMPLAINT       Chief Complaint   Patient presents with    Rash     Rash to R ankle. States podiatrist diagnosed her with MRSA. States she took bactrim and finished prescription \"a few days ago. \"  States that she has an appointment with infectious disease on 09/27. HISTORY OF PRESENT ILLNESS   (Location/Symptom, Timing/Onset, Context/Setting, Quality, Duration, Modifying Factors, Severity)  Note limiting factors. Chief Complaint: Just finished antibiotics for MRSA and swelling and pain worse in the right lower extremity 5 out of 10 achy constant worse with movement and better at rest    Juan José Voss is a 32 y.o. female who presents stating that for most of months she has been dealing with and trying to get what appears to be an MRSA cellulitis under control. She saw podiatry on the 31st of last month and they got cultures. That eventually grew out positive for MRSA. Patient was on a couple of oral antibiotics and then switched to Bactrim which she just finished up. In spite of that swelling and pain redness and tenderness is persistent and worse in the right lower extremity from the shin down over the dorsal right foot and toes. There were plans of trying to see infectious disease outpatient however they would not be able to get her in until more than a week out from now and they decided to come to the ER for further care and treatment. Patient also states that she has a history of psoriasis. She is on Humira for this. This is ongoing.     Nursing Notes were all reviewed and agreed with or any disagreements were addressed in the HPI. REVIEW OF SYSTEMS    (2-9 systems for level 4, 10 or more for level 5)     Review of Systems  Positive history as above with no acute fall contusion or twisting injury, no headache vision change neck pain or stiffness shortness of breath or chest pain. No abdominal pain or difficulty eating or drinking. No acute urine or stool change. No extremity acute loss of range of motion or strength.   Skin is peeling and red and very tender and warm as described above in the right lower extremity especially anterior surfaces from the shin over the foot and toes    PAST MEDICAL HISTORY     Past Medical History:   Diagnosis Date    Anxiety     Arthritis 2015    psoariatic    Depression     Former smoker 5-1-12    GERD (gastroesophageal reflux disease)     Migraine     Onychomycosis        SURGICAL HISTORY     Past Surgical History:   Procedure Laterality Date    COSMETIC SURGERY      Bilateral Ears    WISDOM TOOTH EXTRACTION         CURRENTMEDICATIONS       Previous Medications    ADALIMUMAB (HUMIRA PEN) 40 MG/0.4ML PNKT    Inject 40 mg into the skin every 14 days    BUSPIRONE (BUSPAR) 5 MG TABLET    Take 5 mg by mouth 3 times daily    COENZYME Q10 (COQ-10) 100 MG CAPS    Take by mouth    CYANOCOBALAMIN 100 MCG TABLET    Take 1 tablet by mouth daily    NORGESTREL-ETHINYL ESTRADIOL (LOW-OGESTREL) 0.3-30 MG-MCG PER TABLET    Take 1 tablet by mouth daily    OMEPRAZOLE (PRILOSEC) 20 MG DELAYED RELEASE CAPSULE    Take 20 mg by mouth daily    OXCARBAZEPINE (TRILEPTAL) 300 MG TABLET    Take 300 mg by mouth 3 times daily        ALLERGIES     Diflucan [fluconazole]    FAMILYHISTORY       Family History   Problem Relation Age of Onset    High Blood Pressure Mother     Asthma Brother     Stroke Paternal Uncle     Cancer Maternal Grandmother     Cancer Maternal Grandfather     Diabetes Paternal Grandfather         SOCIAL HISTORY       Social History     Socioeconomic History    Marital status: Single Spouse name: None    Number of children: 0    Years of education: None    Highest education level: None   Occupational History    Occupation: [employer]/student   Tobacco Use    Smoking status: Former     Packs/day: 0.25     Types: Cigarettes     Quit date: 5/1/2012     Years since quitting: 10.3    Smokeless tobacco: Never    Tobacco comments:     discussed stopping  does not smoke daily   Substance and Sexual Activity    Alcohol use: Yes     Alcohol/week: 0.0 standard drinks     Comment: Occasional    Drug use: No     Social Determinants of Health     Financial Resource Strain: Low Risk     Difficulty of Paying Living Expenses: Not hard at all   Food Insecurity: No Food Insecurity    Worried About [address] in the Last Year: Never true    [address]  [employer] in the Last Year: Never true       SCREENINGS    Susie Coma Scale  Eye Opening: Spontaneous  Best Verbal Response: Oriented  Best Motor Response: Obeys commands  [place] Coma Scale Score: 15        PHYSICAL EXAM    (up to 7 for level 4, 8 or more for level 5)     ED Triage Vitals [09/19/22 1245]   BP Temp Temp Source Heart Rate Resp SpO2 Height Weight   137/82 98.1 °F (36.7 °C) Oral 84 14 100 % 5' 8\" (1.727 m) 190 lb (86.2 kg)       Physical Exam  Vitals and nursing note reviewed. Constitutional:       Appearance: Normal appearance. She is not diaphoretic. HENT:      Head: Normocephalic and atraumatic. Right Ear: External ear normal.      Left Ear: External ear normal.      Nose: Nose normal.      Mouth/Throat:      Mouth: Mucous membranes are moist.      Pharynx: No posterior oropharyngeal erythema. Eyes:      General:         Right eye: No discharge. Left eye: No discharge. Conjunctiva/sclera: Conjunctivae normal.   Cardiovascular:      Rate and Rhythm: Normal rate and regular rhythm. Pulses: Normal pulses. Heart sounds: Normal heart sounds. No murmur heard. No gallop.    Pulmonary:      Effort: Pulmonary effort is normal. No respiratory distress. Breath sounds: Normal breath sounds. No wheezing, rhonchi or rales. Musculoskeletal:         General: Tenderness present. No signs of injury. Normal range of motion. Cervical back: Normal range of motion and neck supple. No rigidity. Lymphadenopathy:      Cervical: No cervical adenopathy. Skin:     General: Skin is warm and dry. Capillary Refill: Capillary refill takes less than 2 seconds. Findings: Erythema and rash present. Comments: Skin is peeling and red and very tender and warm as described above in the right lower extremity especially anterior surfaces from the shin over the foot and toes   Neurological:      Mental Status: She is alert and oriented to person, place, and time. Mental status is at baseline. Motor: No weakness. Coordination: Coordination normal.   Psychiatric:         Mood and Affect: Mood normal.         Behavior: Behavior normal.         DIAGNOSTIC RESULTS   LABS:    Labs Reviewed   CBC WITH AUTO DIFFERENTIAL - Abnormal; Notable for the following components:       Result Value    WBC 2.8 (*)     Neutrophils Absolute 1.3 (*)     All other components within normal limits   BASIC METABOLIC PANEL W/ REFLEX TO MG FOR LOW K - Abnormal; Notable for the following components:    Sodium 132 (*)     Chloride 98 (*)     BUN 6 (*)     All other components within normal limits   CULTURE, WOUND   CULTURE, BLOOD 1   CULTURE, BLOOD 2   LACTATE, SEPSIS   LACTATE, SEPSIS       When ordered, only abnormal lab results are displayed. All other labs were within normal range or not returned as of this dictation. EKG: When ordered, EKG's are interpreted by the Emergency Department Physician in the absence of a cardiologist.  Please see their note for interpretation of EKG.       RADIOLOGY:   Non-plain film images such as CT, Ultrasound and MRI are read by the radiologist. Plain radiographic images are visualized andpreliminarily interpreted

## 2022-09-19 NOTE — CONSULTS
Clinical Pharmacy Note  Vancomycin Consult    Pharmacy consult received for one-time dose of vancomycin in the Emergency Department per Tres Cox PA-C. Ht Readings from Last 1 Encounters:   09/19/22 5' 8\" (1.727 m)        Wt Readings from Last 1 Encounters:   09/19/22 190 lb (86.2 kg)         Assessment/Plan:  Vancomycin 1500mg x 1 in ED. If Vancomycin is to continue on admission and pharmacy is to manage dosing, please re-consult with admission orders.         ADENIKE Smith [facility], 9/19/2022 1:38 PM

## 2022-09-19 NOTE — PROGRESS NOTES
Medication Reconciliation    List of medications patient is currently taking is complete. Source of information: 1. Conversation with patient at bedside                                       2. EPIC records      Allergies  Diflucan [fluconazole]     Notes regarding home medications:   1. Patient is currently out of her Oxcarbazepine - she takes this for Bipolar and not epilepsy. 2. With the exception of Oxcarbazepine, she has taken all of her normal morning maintenance medications today.     Jessy Baldwin [facility], PharmD, BCPS  9/19/2022 5:51 PM

## 2022-09-20 PROBLEM — B35.3 TINEA PEDIS OF RIGHT FOOT: Status: ACTIVE | Noted: 2022-09-20

## 2022-09-20 PROBLEM — Z78.9 FAILURE OF OUTPATIENT TREATMENT: Status: ACTIVE | Noted: 2022-09-20

## 2022-09-20 PROBLEM — A49.01 MSSA (METHICILLIN SUSCEPTIBLE STAPHYLOCOCCUS AUREUS) INFECTION: Status: ACTIVE | Noted: 2022-09-20

## 2022-09-20 PROBLEM — Z79.620 ADALIMUMAB (HUMIRA) LONG-TERM USE: Status: ACTIVE | Noted: 2022-09-20

## 2022-09-20 PROBLEM — D72.819 LEUCOPENIA: Status: ACTIVE | Noted: 2022-09-20

## 2022-09-20 LAB
ANION GAP SERPL CALCULATED.3IONS-SCNC: 7 MMOL/L (ref 3–16)
BASOPHILS ABSOLUTE: 0 K/UL (ref 0–0.2)
BASOPHILS RELATIVE PERCENT: 0.7 %
BUN BLDV-MCNC: 6 MG/DL (ref 7–20)
CALCIUM SERPL-MCNC: 8.3 MG/DL (ref 8.3–10.6)
CHLORIDE BLD-SCNC: 103 MMOL/L (ref 99–110)
CO2: 24 MMOL/L (ref 21–32)
CREAT SERPL-MCNC: 0.7 MG/DL (ref 0.6–1.1)
EOSINOPHILS ABSOLUTE: 0.1 K/UL (ref 0–0.6)
EOSINOPHILS RELATIVE PERCENT: 5 %
GFR AFRICAN AMERICAN: >60
GFR NON-AFRICAN AMERICAN: >60
GLUCOSE BLD-MCNC: 81 MG/DL (ref 70–99)
HCT VFR BLD CALC: 34.1 % (ref 36–48)
HEMOGLOBIN: 11.5 G/DL (ref 12–16)
LYMPHOCYTES ABSOLUTE: 1.2 K/UL (ref 1–5.1)
LYMPHOCYTES RELATIVE PERCENT: 42.4 %
MCH RBC QN AUTO: 29.9 PG (ref 26–34)
MCHC RBC AUTO-ENTMCNC: 33.8 G/DL (ref 31–36)
MCV RBC AUTO: 88.3 FL (ref 80–100)
MONOCYTES ABSOLUTE: 0.3 K/UL (ref 0–1.3)
MONOCYTES RELATIVE PERCENT: 10 %
NEUTROPHILS ABSOLUTE: 1.2 K/UL (ref 1.7–7.7)
NEUTROPHILS RELATIVE PERCENT: 41.9 %
PDW BLD-RTO: 14.3 % (ref 12.4–15.4)
PLATELET # BLD: 161 K/UL (ref 135–450)
PMV BLD AUTO: 8.4 FL (ref 5–10.5)
POTASSIUM REFLEX MAGNESIUM: 4.7 MMOL/L (ref 3.5–5.1)
RBC # BLD: 3.86 M/UL (ref 4–5.2)
SODIUM BLD-SCNC: 134 MMOL/L (ref 136–145)
WBC # BLD: 2.8 K/UL (ref 4–11)

## 2022-09-20 PROCEDURE — 6370000000 HC RX 637 (ALT 250 FOR IP): Performed by: INTERNAL MEDICINE

## 2022-09-20 PROCEDURE — 6360000002 HC RX W HCPCS: Performed by: INTERNAL MEDICINE

## 2022-09-20 PROCEDURE — 99223 1ST HOSP IP/OBS HIGH 75: CPT | Performed by: INTERNAL MEDICINE

## 2022-09-20 PROCEDURE — 6370000000 HC RX 637 (ALT 250 FOR IP): Performed by: PODIATRIST

## 2022-09-20 PROCEDURE — 9990000010 HC NO CHARGE VISIT

## 2022-09-20 PROCEDURE — 80048 BASIC METABOLIC PNL TOTAL CA: CPT

## 2022-09-20 PROCEDURE — 2580000003 HC RX 258: Performed by: INTERNAL MEDICINE

## 2022-09-20 PROCEDURE — 85025 COMPLETE CBC W/AUTO DIFF WBC: CPT

## 2022-09-20 PROCEDURE — 87205 SMEAR GRAM STAIN: CPT

## 2022-09-20 PROCEDURE — 36415 COLL VENOUS BLD VENIPUNCTURE: CPT

## 2022-09-20 PROCEDURE — 94760 N-INVAS EAR/PLS OXIMETRY 1: CPT

## 2022-09-20 PROCEDURE — 1200000000 HC SEMI PRIVATE

## 2022-09-20 PROCEDURE — 87070 CULTURE OTHR SPECIMN AEROBIC: CPT

## 2022-09-20 RX ORDER — DOXYCYCLINE HYCLATE 100 MG
100 TABLET ORAL EVERY 12 HOURS SCHEDULED
Status: DISCONTINUED | OUTPATIENT
Start: 2022-09-20 | End: 2022-09-20

## 2022-09-20 RX ORDER — TRIAMCINOLONE ACETONIDE 1 MG/G
CREAM TOPICAL 2 TIMES DAILY
Status: DISCONTINUED | OUTPATIENT
Start: 2022-09-20 | End: 2022-09-21 | Stop reason: HOSPADM

## 2022-09-20 RX ORDER — CEFUROXIME AXETIL 250 MG/1
500 TABLET ORAL EVERY 12 HOURS SCHEDULED
Status: DISCONTINUED | OUTPATIENT
Start: 2022-09-20 | End: 2022-09-20

## 2022-09-20 RX ORDER — CLOTRIMAZOLE 1 %
CREAM (GRAM) TOPICAL 2 TIMES DAILY
Status: DISCONTINUED | OUTPATIENT
Start: 2022-09-20 | End: 2022-09-21 | Stop reason: HOSPADM

## 2022-09-20 RX ORDER — LINEZOLID 600 MG/1
600 TABLET, FILM COATED ORAL EVERY 12 HOURS SCHEDULED
Status: DISCONTINUED | OUTPATIENT
Start: 2022-09-20 | End: 2022-09-20

## 2022-09-20 RX ADMIN — PANTOPRAZOLE SODIUM 40 MG: 40 TABLET, DELAYED RELEASE ORAL at 07:45

## 2022-09-20 RX ADMIN — CLOTRIMAZOLE: 10 CREAM TOPICAL at 18:01

## 2022-09-20 RX ADMIN — Medication 1250 MG: at 04:02

## 2022-09-20 RX ADMIN — DIPHENHYDRAMINE HCL 25 MG: 25 TABLET ORAL at 20:13

## 2022-09-20 RX ADMIN — Medication 100 MCG: at 08:22

## 2022-09-20 RX ADMIN — DIPHENHYDRAMINE HCL 25 MG: 25 TABLET ORAL at 12:15

## 2022-09-20 RX ADMIN — SODIUM CHLORIDE, PRESERVATIVE FREE 10 ML: 5 INJECTION INTRAVENOUS at 08:32

## 2022-09-20 RX ADMIN — BUSPIRONE HYDROCHLORIDE 20 MG: 10 TABLET ORAL at 01:11

## 2022-09-20 RX ADMIN — CEFAZOLIN 2000 MG: 2 INJECTION, POWDER, FOR SOLUTION INTRAMUSCULAR; INTRAVENOUS at 16:44

## 2022-09-20 RX ADMIN — OXCARBAZEPINE 300 MG: 300 TABLET, FILM COATED ORAL at 08:22

## 2022-09-20 RX ADMIN — SODIUM CHLORIDE 25 ML: 9 INJECTION, SOLUTION INTRAVENOUS at 20:18

## 2022-09-20 RX ADMIN — SODIUM CHLORIDE, PRESERVATIVE FREE 10 ML: 5 INJECTION INTRAVENOUS at 20:19

## 2022-09-20 RX ADMIN — BUSPIRONE HYDROCHLORIDE 20 MG: 10 TABLET ORAL at 20:13

## 2022-09-20 RX ADMIN — CEFAZOLIN 2000 MG: 2 INJECTION, POWDER, FOR SOLUTION INTRAMUSCULAR; INTRAVENOUS at 23:30

## 2022-09-20 RX ADMIN — MUPIROCIN: 20 OINTMENT TOPICAL at 23:30

## 2022-09-20 RX ADMIN — OXCARBAZEPINE 1200 MG: 300 TABLET, FILM COATED ORAL at 01:11

## 2022-09-20 RX ADMIN — CETIRIZINE HYDROCHLORIDE 10 MG: 10 TABLET, FILM COATED ORAL at 08:23

## 2022-09-20 RX ADMIN — OXCARBAZEPINE 1200 MG: 300 TABLET, FILM COATED ORAL at 20:13

## 2022-09-20 RX ADMIN — BUSPIRONE HYDROCHLORIDE 20 MG: 10 TABLET ORAL at 08:22

## 2022-09-20 RX ADMIN — BUSPIRONE HYDROCHLORIDE 20 MG: 10 TABLET ORAL at 14:26

## 2022-09-20 RX ADMIN — TRIAMCINOLONE ACETONIDE: 1 CREAM TOPICAL at 23:30

## 2022-09-20 ASSESSMENT — PAIN SCALES - GENERAL: PAINLEVEL_OUTOF10: 0

## 2022-09-20 NOTE — PROGRESS NOTES
Pt admitted to [address] [place] [address] via ED. Pt is up ad kurtis and states that she only starts to feel weak in her right leg if she is on it for long periods of time. She was instructed to call for help if she starts to feel weak. Pt is here for cellulitis to her right lower extremity. The right shin down to her toes is red in color and dry. There are no open areas observed, and swelling is 1+ with pitting. Pt has equal strength throughout. She is able to make her needs known and is oriented to the call light. Bed is low and locked, alarm is not indicated and call light is within reach.

## 2022-09-20 NOTE — CONSULTS
Clinical Pharmacy Note  Vancomycin Consult    Doracs Owen is a 32 y.o. female ordered Vancomycin for cellulitis; consult received from Dr. Addison Calderon to manage therapy. Allergies:  Diflucan [fluconazole]     Temp max:  Temp (24hrs), Av.1 °F (36.7 °C), Min:98.1 °F (36.7 °C), Max:98.1 °F (36.7 °C)      Recent Labs     22  1522   WBC 2.8*       Recent Labs     22  1522   BUN 6*   CREATININE 0.6       No intake or output data in the 24 hours ending 22    Culture Results:  MRSA    Ht Readings from Last 1 Encounters:   22 5' 8\" (1.727 m)        Wt Readings from Last 1 Encounters:   22 190 lb (86.2 kg)         Estimated Creatinine Clearance: 159 mL/min (based on SCr of 0.6 mg/dL). Assessment/Plan:  Day # 1 of vancomycin. Vancomycin 1250 mg IV every 12 hours ordered. Goal -600  Predicted       Thank you for the consult.

## 2022-09-20 NOTE — CONSULTS
Department of Podiatric Surgery  History and Physical        CHIEF COMPLAINT:    Chief Complaint   Patient presents with    Rash     Rash to R ankle. States podiatrist diagnosed her with MRSA. States she took bactrim and finished prescription \"a few days ago. \"  States that she has an appointment with infectious disease on 09/27. Reason for Consultation:  Right leg cellulitis    History Obtained From:  patient, spouse, mother, father    HISTORY OF PRESENT ILLNESS:      The patient is a 32 y.o. female who presents with 2 month history of exfoliative dermatitis of the right lower extremity. She has had fissures and cultures of the area were taken and demonstrated MRSA. She sees Dr. Miguelangel Montelongo at [address] [place] [address]. She has had many different topical treatments, but no steroids of late. The patient presented with cellulitis and is currently using lotrimin in between the toes. Past Medical History:        Diagnosis Date    Anxiety     Arthritis 2015    psoariatic    Depression     Former smoker 5-1-12    GERD (gastroesophageal reflux disease)     Migraine     Onychomycosis      Past Surgical History:        Procedure Laterality Date    COSMETIC SURGERY      Bilateral Ears    WISDOM TOOTH EXTRACTION                   FAMILY HISTORY    Family History   Problem Relation Age of Onset    High Blood Pressure Mother     Asthma Brother     Stroke Paternal Uncle     Cancer Maternal Grandmother     Cancer Maternal Grandfather     Diabetes Paternal Grandfather        SOCIAL HISTORY    Social History     Tobacco Use    Smoking status: Former     Packs/day: 0.25     Types: Cigarettes     Quit date: 5/1/2012     Years since quitting: 10.3    Smokeless tobacco: Never    Tobacco comments:     discussed stopping  does not smoke daily   Substance Use Topics    Alcohol use:  Yes     Alcohol/week: 0.0 standard drinks     Comment: Occasional    Drug use: No       ALLERGIES    Allergies   Allergen Reactions    Diflucan [Fluconazole] Other (See Comments)     Mouth lesions in and around mouth       MEDICATIONS    No current facility-administered medications on file prior to encounter. Current Outpatient Medications on File Prior to Encounter   Medication Sig Dispense Refill    adalimumab (HUMIRA) 40 MG/0.8ML injection Inject 40 mg into the skin every 14 days      OXcarbazepine (TRILEPTAL) 300 MG tablet Take 300 mg by mouth every morning      OXcarbazepine (TRILEPTAL) 300 MG tablet Take 1,200 mg by mouth at bedtime      busPIRone (BUSPAR) 10 MG tablet Take 20 mg by mouth 3 times daily      fexofenadine (ALLEGRA) 180 MG tablet Take 180 mg by mouth daily      omeprazole (PRILOSEC) 20 MG delayed release capsule Take 20 mg by mouth daily      cyanocobalamin 100 MCG tablet Take 1 tablet by mouth daily      Coenzyme Q10 (COQ-10) 100 MG CAPS Take 100 mg by mouth daily         Allergies:  Diflucan [fluconazole]    REVIEW OF SYSTEMS:  CONSTITUTIONAL:  negative  EYES:  negative  HEENT:  negative  RESPIRATORY:  negative  CARDIOVASCULAR:  negative  GASTROINTESTINAL:  negative  ENDOCRINE:  negative  MUSCULOSKELETAL:  negative  NEUROLOGICAL:  negative    PHYSICAL EXAM:  VITALS:  /79   Pulse 97   Temp 97.8 °F (36.6 °C) (Oral)   Resp 16   Ht 5' 8\" (1.727 m)   Wt 194 lb 0.1 oz (88 kg)   LMP 08/16/2022 (Approximate)   SpO2 100%   BMI 29.50 kg/m²   CONSTITUTIONAL:  awake, alert, cooperative, no apparent distress, and appears stated age  EYES:  pupils equal, round and reactive to light, extra ocular muscles intact, sclera clear, conjunctiva normal      LOWER EXTREMITY:  VASCULAR: Pedal Pulses present. negative signs of ischemia. MUSCULOSKELETAL:  negative gross deformity. NEUROLOGIC:  Epicritic sensation, light touch, joint position senseunchanged  SKIN:  The right lower extremity has a exfoliative dermatitis from the dorsal digits to the mid calf. This is not new according to the paitnet, but now it does not hurt. The redness is resolving. I/O:No intake or output data in the 24 hours ending 09/20/22 1906           Wt Readings from Last 3 Encounters:   09/20/22 194 lb 0.1 oz (88 kg)   08/25/22 191 lb (86.6 kg)   07/26/21 174 lb 6.4 oz (79.1 kg)       LABS:    Recent Labs     09/19/22  1522 09/20/22  0655   WBC 2.8* 2.8*   HGB 13.0 11.5*   HCT 38.1 34.1*    161        Recent Labs     09/20/22  0655   *   K 4.7      CO2 24   BUN 6*   CREATININE 0.7      No results for input(s): PROT, INR, APTT in the last 72 hours. IMAGING:  X-rays normal    MICRO:   MRSA + from outpatient testing, but no cultures inpatient    ASSESSMENT   Right dermatitis  RIght cellulitis of leg      PLAN:  Evaluation and Management x 30 minutes with greater than 50% of the time spent with the patient discussing the etiology and treatment options of the chief complaint. 1. Dermatitis  Chioma is related to her auto-immune psoriasis outbreak  Stop inflammatory skin agents  Start topical Corticosteroid applications      DISPO: Will follow this interesting case. Patient may be d/c tomorrow or when medically stable. Thanks for the opportunity to participate in this patient's care.      Nette Louie DPM DPM  [facility]  Pager: [phone]  Office: [phone]  Fax: [phone]

## 2022-09-20 NOTE — PROGRESS NOTES
Occupational Therapy  Prasanth White    OT order noted. Note no wounds on plantar surface of feet. Entered room to patient standing at sink and brushing teeth. She reports she is moving at her independent baseline. OT signing off.     Electronically signed by Zhanna Gonzalez OT on 9/20/22 at 10:02 AM EDT

## 2022-09-20 NOTE — H&P
CAPS Take 100 mg by mouth daily    Historical Provider, MD       Allergies:  Diflucan [fluconazole]    Social History:      TOBACCO:   reports that she quit smoking about 10 years ago. Her smoking use included cigarettes. She smoked an average of .25 packs per day. She has never used smokeless tobacco.  ETOH:   reports current alcohol use. Family History:       Reviewed in detail and non contributory          Problem Relation Age of Onset    High Blood Pressure Mother     Asthma Brother     Stroke Paternal Uncle     Cancer Maternal Grandmother     Cancer Maternal Grandfather     Diabetes Paternal Grandfather        REVIEW OF SYSTEMS:   Pertinent positives as noted in the HPI. All other systems reviewed and negative. PHYSICAL EXAM PERFORMED:    BP (!) 142/88   Pulse 76   Temp 98.1 °F (36.7 °C) (Oral)   Resp 16   Ht 5' 8\" (1.727 m)   Wt 190 lb (86.2 kg)   LMP 08/16/2022 (Approximate)   SpO2 100%   BMI 28.89 kg/m²     General appearance:  No apparent distress, cooperative. HEENT:  Normal cephalic, atraumatic without obvious deformity. Conjunctivae/corneas clear. Neck: Supple, with full range of motion. No cervical lymphadenopathy  Respiratory:  Normal respiratory effort. Clear to auscultation, bilaterally without Rales/Wheezes/Rhonchi. Cardiovascular:  Regular rate and rhythm with normal S1/S2 without murmurs, rubs or gallops. Abdomen: Soft, non-tender, non-distended, normal bowel sounds. Musculoskeletal:  R foot redness, swelling noted  Skin: no rash visible  Neurologic:  Neurologically intact without any focal sensory/motor deficits.   grossly non-focal.  Psychiatric:  Alert and oriented, normal mood  Peripheral Pulses: +2 palpable, equal bilaterally       Labs:     Recent Labs     09/19/22  1522   WBC 2.8*   HGB 13.0   HCT 38.1        Recent Labs     09/19/22  1522   *   K 4.1   CL 98*   CO2 24   BUN 6*   CREATININE 0.6   CALCIUM 8.6     No results for input(s): AST, ALT, BILIDIR, BILITOT, ALKPHOS in the last 72 hours. No results for input(s): INR in the last 72 hours. No results for input(s): Melvi Shanks in the last 72 hours. Urinalysis:      Lab Results   Component Value Date/Time    NITRU Negative 04/26/2016 10:38 AM    BLOODU Negative 04/26/2016 10:38 AM    SPECGRAV 1.018 04/26/2016 10:38 AM    GLUCOSEU Negative 04/26/2016 10:38 AM       Radiology:       XR FOOT RIGHT (MIN 3 VIEWS)   Final Result      No acute findings in the right foot. Active Hospital Problems    Diagnosis Date Noted    Cellulitis [L03.90] 09/19/2022     Priority: Medium       Patient is a 58-year-old female with past medical history of psoriatic arthritis who presents to the hospital for right lower extremity redness swelling as well as itching. According to patient she has been having right lower extremity swelling and redness for the past 1 month, it is not improving, it is getting worse, she was tried on antibiotics without much provement so she decided to come to the hospital.  Patient mentions her swelling has gotten worse. She is also on Humira for psoriatic arthritis. Denied fevers chills. Assessment  Right foot cellulitis  Right lower extremity swelling  History of psoriatic arthritis    Plan  Start IV vancomycin  Consult ID  Consult podiatry  Patient recently had DVT ultrasound which was negative for DVT  Monitor and replace electrolytes  Patient is leukopenic and neutropenic  Resume home medications  DVT prophylaxis-Lovenox  Diet: No diet orders on file  Code Status: No Order    PT/OT Eval Status: ordered    Dispo - pending clinical improvement       Celina Angela MD    The note was completed using EMR and Dragon dictation system. Every effort was made to ensure accuracy; however, inadvertent computerized transcription errors may be present. Thank you Tani Calero MD for the opportunity to be involved in this patient's care.  If you have any questions or concerns please feel free to contact me at [phone].     Marcelino Daniels MD

## 2022-09-20 NOTE — PLAN OF CARE
Problem: Discharge Planning  Goal: Discharge to home or other facility with appropriate resources  Outcome: Progressing  Flowsheets (Taken 9/19/2022 3810)  Discharge to home or other facility with appropriate resources: Identify barriers to discharge with patient and caregiver     Problem: Pain  Goal: Verbalizes/displays adequate comfort level or baseline comfort level  Outcome: Progressing     Problem: Safety - Adult  Goal: Free from fall injury  Outcome: Progressing

## 2022-09-20 NOTE — CONSULTS
Infectious Diseases Inpatient Consult Note      Reason for Consult:  Rt leg cellulitis and on Humira for Psoriatic Arthritis     Requesting Physician:  Dr. Evonne Chandra    Primary Care Physician:  Valentino Barrios, MD    History Obtained From:  [facility] and patient     CHIEF COMPLAINT:     Chief Complaint   Patient presents with    Rash     Rash to R ankle. States podiatrist diagnosed her with MRSA. States she took bactrim and finished prescription \"a few days ago. \"  States that she has an appointment with infectious disease on 09/27. HISTORY OF PRESENT ILLNESS:  32 y.o. woman with Psoriatic arthritis on Humira, Rt foot Tinea pedis on Lamisil recently has on going Leukopenia since been on Humira for te past > 1yr per patient was seen by Podiatry as out pt and wound cx Rt foot with MSSA + was given Oral Bactrim and Rt foot cellulitis slow to resolve and now admitted with worsening symptoms. She works as  and feet get sweaty.    Location : Rt foot pain, swelling redness      Quality :  aching      Severity : 8/10    Duration :   2 weeks   Timing : intermittent   Context :  Rt foot Tinea pedis,, Psoriatic arthritis    Modifying factors :none   Associated signs and symptoms:Rt foot swelling, redness skin flaking         Past Medical History:    Past Medical History:   Diagnosis Date    Anxiety     Arthritis 2015    psoariatic    Depression     Former smoker 5-1-12    GERD (gastroesophageal reflux disease)     Migraine     Onychomycosis        Past Surgical History:    Past Surgical History:   Procedure Laterality Date    COSMETIC SURGERY      Bilateral Ears    WISDOM TOOTH EXTRACTION         Current Medications:    Outpatient Medications Marked as Taking for the 9/19/22 encounter [facility] Encounter)   Medication Sig Dispense Refill    adalimumab (HUMIRA) 40 MG/0.8ML injection Inject 40 mg into the skin every 14 days      OXcarbazepine (TRILEPTAL) 300 MG tablet Take 300 mg by mouth every morning OXcarbazepine (TRILEPTAL) 300 MG tablet Take 1,200 mg by mouth at bedtime      busPIRone (BUSPAR) 10 MG tablet Take 20 mg by mouth 3 times daily      fexofenadine (ALLEGRA) 180 MG tablet Take 180 mg by mouth daily         Allergies:  Diflucan [fluconazole]    Immunizations :   Immunization History   Administered Date(s) Administered    COVID-19, PFIZER PURPLE top, DILUTE for use, (age 15 y+), 30mcg/0.3mL 05/07/2021, 06/04/2021         Social History:     Social History     Tobacco Use    Smoking status: Former     Packs/day: 0.25     Types: Cigarettes     Quit date: 5/1/2012     Years since quitting: 10.3    Smokeless tobacco: Never    Tobacco comments:     discussed stopping  does not smoke daily   Substance Use Topics    Alcohol use:  Yes     Alcohol/week: 0.0 standard drinks     Comment: Occasional    Drug use: No     Social History     Tobacco Use   Smoking Status Former    Packs/day: 0.25    Types: Cigarettes    Quit date: 5/1/2012    Years since quitting: 10.3   Smokeless Tobacco Never   Tobacco Comments    discussed stopping  does not smoke daily      Family History   Problem Relation Age of Onset    High Blood Pressure Mother     Asthma Brother     Stroke Paternal Uncle     Cancer Maternal Grandmother     Cancer Maternal Grandfather     Diabetes Paternal Grandfather           REVIEW OF SYSTEMS:      Constitutional:  negative for fevers, chills, night sweats  Eyes:  negative for blurred vision, eye discharge, visual disturbance   HEENT:  negative for hearing loss, ear drainage,nasal congestion  Respiratory:  negative for cough, shortness of breath or hemoptysis   Cardiovascular:  negative for chest pain, palpitations, syncope  Gastrointestinal:  negative for nausea, vomiting, diarrhea, constipation, abdominal pain  Genitourinary:  negative for frequency, dysuria, urinary incontinence, hematuria  Hematologic/Lymphatic:  negative for easy bruising, bleeding and lymphadenopathy  Allergic/Immunologic:  negative for recurrent infections, angioedema, anaphylaxis   Endocrine:  negative for weight changes, polyuria, polydipsia and polyphagia  Musculoskeletal: Rt leg pain + swelling+ redness   pain, swelling, decreased range of motion  Integumentary: No rashes, skin lesions  Neurological:  negative for headaches, slurred speech, unilateral weakness  Psychiatric: negative for hallucinations,confusion,agitation.      PHYSICAL EXAM:      Vitals:    /74   Pulse 79   Temp 97.3 °F (36.3 °C) (Oral)   Resp 18   Ht 5' 8\" (1.727 m)   Wt 194 lb 0.1 oz (88 kg)   LMP 08/16/2022 (Approximate)   SpO2 100%   BMI 29.50 kg/m²     General Appearance: alert,in some  acute distress, ++  pallor, no icterus   Skin: warm and dry, no rash or erythema  Head: normocephalic and atraumatic  Eyes: pupils equal, round, and reactive to light, conjunctivae normal  ENT: tympanic membrane, external ear and ear canal normal bilaterally, nose without deformity, nasal mucosa and turbinates normal without polyps  Neck: supple and non-tender without mass, no thyromegaly  no cervical lymphadenopathy  Pulmonary/Chest: clear to auscultation bilaterally- no wheezes, rales or rhonchi, normal air movement, no respiratory distress  Cardiovascular: normal rate, regular rhythm, normal S1 and S2, no murmurs, rubs, clicks, or gallops, no carotid bruits  Abdomen: soft, non-tender, non-distended, normal bowel sounds, no masses or organomegaly  Extremities: no cyanosis, clubbing or edema  Musculoskeletal: normal range of motion, no joint swelling, deformity or tenderness  Integumentary: No rashes, no abnormal skin lesions, no petechiae  Neurologic: reflexes normal and symmetric, no cranial nerve deficit  Psych:  Orientation, sensorium, mood normal   Lines: IV  Rt foot dorsum scaly skin and Tinea pedis on going cellulitis lower 1/3 of the legs -   Psoriatic plaques  -     DATA:    CBC:   Lab Results   Component Value Date    WBC 2.8 (L) 09/20/2022    HGB 11.5 (L) 09/20/2022    HCT 34.1 (L) 09/20/2022    MCV 88.3 09/20/2022     09/20/2022     RENAL:   Lab Results   Component Value Date    CREATININE 0.7 09/20/2022    BUN 6 (L) 09/20/2022     (L) 09/20/2022    K 4.7 09/20/2022     09/20/2022    CO2 24 09/20/2022     SED RATE:   Lab Results   Component Value Date/Time    SEDRATE 15 02/18/2016 11:57 AM     CK: No results found for: CKTOTAL  CRP:   Lab Results   Component Value Date/Time    CRP 3.3 02/18/2016 11:57 AM     Hepatic Function Panel:   Lab Results   Component Value Date/Time    ALKPHOS 56 06/18/2021 01:56 PM    ALT 18 06/18/2021 01:56 PM    AST 25 06/18/2021 01:56 PM    PROT 7.1 06/18/2021 01:56 PM    PROT 6.9 07/19/2012 03:46 PM    BILITOT 0.4 06/18/2021 01:56 PM    BILIDIR 0.10 07/19/2012 03:46 PM    IBILI 0.2 07/19/2012 03:46 PM    LABALBU 4.6 06/18/2021 01:56 PM     UA:  Lab Results   Component Value Date/Time    COLORU YELLOW 04/26/2016 10:38 AM    CLARITYU Clear 04/26/2016 10:38 AM    GLUCOSEU Negative 04/26/2016 10:38 AM    BILIRUBINUR Negative 04/26/2016 10:38 AM    KETUA Negative 04/26/2016 10:38 AM    SPECGRAV 1.018 04/26/2016 10:38 AM    BLOODU Negative 04/26/2016 10:38 AM    PHUR 6.5 04/26/2016 10:38 AM    PROTEINU Negative 04/26/2016 10:38 AM    UROBILINOGEN 0.2 04/26/2016 10:38 AM    NITRU Negative 04/26/2016 10:38 AM    LEUKOCYTESUR Negative 04/26/2016 10:38 AM    LABMICR Not Indicated 04/26/2016 10:38 AM    URINETYPE Voided 04/26/2016 10:38 AM      Urine Microscopic: No results found for: LABCAST, BACTERIA, COMU, HYALCAST, WBCUA, RBCUA, EPIU  Urine Reflex to Culture:   Lab Results   Component Value Date/Time    URRFLXCULT Not Indicated 04/26/2016 10:38 AM         MICRO: cultures reviewed and updated by me      Resulting Agency    SPECIMEN DESCRIPTION  Foot, Right  CENTRAL RECEIVING GS   SPECIMEN TYPE  TISSUE  CENTRAL RECEIVING    GRAM STAIN RESULTS  No WBCs seen  GOOD [facility] 3   GRAM STAIN RESULTS  Rare Gram positive cocci  GOOD [facility] 3   CULTURE RESULTS  Positive Growth Abnormal   GOOD [place] [address]   CULTURE RESULTS  STAPHYLOCOCCUS AUREUS  GOOD [facility] 3   MICRONOTE  (NOTE) Moderate growth of Staphylococcus aureus  GOOD [place] [address]   REPORT STATUS  09/05/2022 FINAL REPORT  GOOD [place] [address]   ORGANISM  STAPHYLOCOCCUS AUREUS  GOOD [place] [address]   Susceptibility    Organism Antibiotic Method Susceptibility   Staphylococcus aureus Ciprofloxacin ENDY <=0.5: Sensitive    Comment: SUSCEPTIBLE   Staphylococcus aureus Clindamycin ENDY <=0.25: Sensitive    Comment: SUSCEPTIBLE   Staphylococcus aureus Erythromycin ENDY <=0.25: Sensitive    Comment: SUSCEPTIBLE   Staphylococcus aureus Gentamicin ENDY <=0.5: Sensitive    Comment: SUSCEPTIBLE   Staphylococcus aureus Levofloxacin ENDY 0.25: Sensitive    Comment: SUSCEPTIBLE   Staphylococcus aureus Moxifloxacin ENDY <=0.25: Sensitive    Comment: SUSCEPTIBLE   Staphylococcus aureus Oxacillin ENDY <=0.25: Sensitive    Comment: SUSCEPTIBLE   Staphylococcus aureus Rifampin ENDY <=0.5: Sensitive    Comment: SUSCEPTIBLE   Staphylococcus aureus Tetracycline ENDY <=1: Sensitive    Comment: SUSCEPTIBLE   Staphylococcus aureus Vancomycin ENDY 1: Sensitive    Comment: SUSCEPTIBLE   (NOTE)   Rifampin and Gentamicin should not be used alone for antimicrobial   therapy. For methicillin resistant staphylococci, ciprofloxacin   should also not be used alone. Staphylococcus aureus Benzylpenicillin ENDY 0.12:  Resistant    Comment: RESISTANT   Staphylococcus aureus Trimethoprim + Sulfamethoxazole ENDY <=10: Sensitive    Comment: SUSCEPTIBLE   Comment: STAPHYLOCOCCUS AUREUS   Specimen Collected: 08/31/22 14:29 Last Resulted: 09/06/22 09:09   Received From: Jayy Sepulveda  Result Received: 09/19/22 11:40        Procedure Component Value Units Date/Time   Culture, Blood 2 [[id number]] Collected: 09/19/22 1521   Order Status: Sent Specimen: Blood Updated: 09/19/22 1910   Culture, Blood 1 [[id number]] Collected: 09/19/22 1521   Order Status: Sent Specimen: Blood Updated: 09/19/22 1910   Culture, Wound Aerobic Only [[id number]]    Order Status: Sent Specimen: Foot        Blood Culture: No results found for: BC, BLOODCULT2    Viral Culture:    No results found for: COVID19  Urine Culture: No results for input(s): Piedad Lopez in the last 72 hours. Scheduled Meds:   ketorolac  15 mg IntraVENous Once    sodium chloride flush  10 mL IntraVENous 2 times per day    enoxaparin  40 mg SubCUTAneous Daily    vancomycin  1,250 mg IntraVENous Q12H    vancomycin (VANCOCIN) intermittent dosing (placeholder)   Other RX Placeholder    busPIRone  20 mg Oral TID    cyanocobalamin  100 mcg Oral Daily    pantoprazole  40 mg Oral QAM AC    OXcarbazepine  300 mg Oral QAM    OXcarbazepine  1,200 mg Oral Nightly    cetirizine  10 mg Oral Daily       Continuous Infusions:   sodium chloride         PRN Meds:  sodium chloride flush, sodium chloride, potassium chloride **OR** potassium alternative oral replacement **OR** [DISCONTINUED] potassium chloride, potassium chloride, magnesium sulfate, promethazine **OR** ondansetron, magnesium hydroxide, acetaminophen **OR** acetaminophen, diphenhydrAMINE    Imaging:   XR FOOT RIGHT (MIN 3 VIEWS)   Final Result      No acute findings in the right foot. Quality: Good. Study Description: Veins of the right lower extremity were evaluated including   the common femoral, femoral, deep femoral, popliteal, tibial, peroneals,   saphenous and contralateral common femoral vein. Right Lower Extremity Venous Findings: No evidence of deep vein thrombosis or   superficial venous thrombus detected in the right lower extremity. There is an   enlarged lymph node identified in the right groin. Left Lower Extremity Venous Findings: There is an enlarged lymph node   identified in the left groin. Patient Disposition: The patient was discharged home.      Impression:        o No evidence of acute deep vein thrombosis in the right lower extremity. o There are enlarged lymph nodes identified in the right and left inguinal        area(s). All pertinent images and reports for the current Hospitalization were reviewed by me. IMPRESSION:    Patient Active Problem List   Diagnosis    Depression    Anxiety    Migraine    GERD (gastroesophageal reflux disease)    Psoriatic arthritis ([facility] [address]    Psoriasis    Chronic midline low back pain without sciatica    Contact dermatitis and other eczema, due to unspecified cause    HPV (human papilloma virus) infection    PTSD (post-traumatic stress disorder)    Rash    Cellulitis     Rt foot and Leg cellulitis  Rt foot skin changes  Tinea pedis+  Leukopenia  ON Humira for Psoriatic arthritis  Depression  Anxiety   Wound cx MSSA  Venous duplex  -ve    Tinea pedis appears to be the portal of entry for the cellulitis and immune suppressed from Humira will cont IV abx as in patient and choose oral abx when ready          Labs, Microbiology, Radiology and pertinent results from current hospitalization and care every where were reviewed by me as a part of the consultation. PLAN :  D/C IV Vancomycin   D/C Doxycycline   D/C Cefuroxime  IV Cefazolin x 2 gm   8 HRS  Clotrimazole topical  twice a day x 14 days  Elevate the leg  Home on oral abx   May be she can hold on Humira for 1 week advised her to d/w her Rheumatologist     Discussed with patient/Family and Nursing   Risk of Complications/Morbidity: High      Illness(es)/ Infection present that pose threat to bodily function. There is potential for severe exacerbation of infection/side effects of treatment. Therapy requires intensive monitoring for antimicrobial agent toxicity. Thanks for allowing me to participate in your patient's care please call me with any questions or concerns.     Dr. Saul David MD  [address] [place] [address] Physician  Phone: [phone]   Fax : [phone]

## 2022-09-20 NOTE — PLAN OF CARE
Problem: Discharge Planning  Goal: Discharge to home or other facility with appropriate resources  9/20/2022 0756 by Bhavesh Park RN  Outcome: Progressing  9/20/2022 0237 by Obie Pittman RN  Outcome: Progressing  Flowsheets (Taken 9/19/2022 2340)  Discharge to home or other facility with appropriate resources: Identify barriers to discharge with patient and caregiver     Problem: Pain  Goal: Verbalizes/displays adequate comfort level or baseline comfort level  9/20/2022 0756 by Bhavesh Park RN  Outcome: Progressing  9/20/2022 0237 by Obie Pittman RN  Outcome: Progressing     Problem: Safety - Adult  Goal: Free from fall injury  9/20/2022 0756 by Bhavesh Park RN  Outcome: Progressing  9/20/2022 0237 by Obie Pittman RN  Outcome: Progressing

## 2022-09-20 NOTE — PROGRESS NOTES
Physical Therapy  Initial Evaluation Attempt and Discharge    22    Name: Hellen Chavez   : 1995    MRN: [id number]    PT order noted. Note no wounds on plantar surface of feet. Entered room to patient standing at sink and brushing teeth. She reports she is moving at her independent baseline. PT signing off.     Electronically signed by Leno Cormier PT on 2022 at 9:07 AM

## 2022-09-20 NOTE — CARE COORDINATION
INITIAL CASE MANAGEMENT ASSESSMENT    Reviewed chart, met with patient to assess possible discharge needs. Explained Case Management role/services. SW met with patient and bharat Liz at bedside today. Patient gave this writer permission to speak in front of family. Living Situation: Patient resides in a single family home with her yamini Walton and several pets. There are four stairs leading up to the front door. Prior to medical admission patient reports that she had no trouble getting in and out of the property. ADLs: Prior to medical admission patient reports that she was independent. She stated that she doesn't anticipate any needs at discharge. DME: Prior to medical admission patient reports that she used no durable medical equipment. She is currently on IVAB at bedside and we are waiting on recommendations. PT/OT Recs: Home independently. Active Services: Prior to medical admission patient reports that she had no active services in place. She stated that she doesn't anticipate any needs at discharge. Transportation: Prior to medical admission patient reports that she was an active . Jeanne Walton informs that he will likely transport her home at discharge. Medications: Patient receives medications from [address] [place] [address]. At this time there are no issues with access or affordability. PCP: Lily Benjamin [phone] (phone) and [phone] (fax number). Patient reports that her last appointment with this provider was August of 2022. HD/PD: N/A    PLAN/COMMENTS:   1) Podiatry clearance and IVAB recs. 2) Work note needed at discharge. 3) Discharge to home with family. SW provided contact information for patient or family to call with any questions. SW will follow and assist as needed. Respectfully submitted,    [facility] JOSH GOLDBERG  [facility]   [phone]    Electronically signed by JOSH Hale on 9/20/2022 at 11:29

## 2022-09-21 VITALS
HEART RATE: 80 BPM | TEMPERATURE: 97.8 F | DIASTOLIC BLOOD PRESSURE: 79 MMHG | SYSTOLIC BLOOD PRESSURE: 120 MMHG | RESPIRATION RATE: 20 BRPM | BODY MASS INDEX: 29.8 KG/M2 | OXYGEN SATURATION: 100 % | WEIGHT: 196.65 LBS | HEIGHT: 68 IN

## 2022-09-21 LAB
ALBUMIN SERPL-MCNC: 3.5 G/DL (ref 3.4–5)
ANION GAP SERPL CALCULATED.3IONS-SCNC: 11 MMOL/L (ref 3–16)
ANION GAP SERPL CALCULATED.3IONS-SCNC: 11 MMOL/L (ref 3–16)
ATYPICAL LYMPHOCYTE RELATIVE PERCENT: 1 % (ref 0–6)
BASOPHILS ABSOLUTE: 0 K/UL (ref 0–0.2)
BASOPHILS RELATIVE PERCENT: 0 %
BUN BLDV-MCNC: 8 MG/DL (ref 7–20)
BUN BLDV-MCNC: 8 MG/DL (ref 7–20)
CALCIUM SERPL-MCNC: 8.2 MG/DL (ref 8.3–10.6)
CALCIUM SERPL-MCNC: 8.2 MG/DL (ref 8.3–10.6)
CHLORIDE BLD-SCNC: 96 MMOL/L (ref 99–110)
CHLORIDE BLD-SCNC: 99 MMOL/L (ref 99–110)
CO2: 24 MMOL/L (ref 21–32)
CO2: 24 MMOL/L (ref 21–32)
CREAT SERPL-MCNC: 0.6 MG/DL (ref 0.6–1.1)
CREAT SERPL-MCNC: 0.6 MG/DL (ref 0.6–1.1)
EOSINOPHILS ABSOLUTE: 0.2 K/UL (ref 0–0.6)
EOSINOPHILS RELATIVE PERCENT: 7 %
GFR AFRICAN AMERICAN: >60
GFR AFRICAN AMERICAN: >60
GFR NON-AFRICAN AMERICAN: >60
GFR NON-AFRICAN AMERICAN: >60
GLUCOSE BLD-MCNC: 84 MG/DL (ref 70–99)
GLUCOSE BLD-MCNC: 86 MG/DL (ref 70–99)
HCT VFR BLD CALC: 32.2 % (ref 36–48)
HEMOGLOBIN: 10.9 G/DL (ref 12–16)
LYMPHOCYTES ABSOLUTE: 1.4 K/UL (ref 1–5.1)
LYMPHOCYTES RELATIVE PERCENT: 50 %
MCH RBC QN AUTO: 29.7 PG (ref 26–34)
MCHC RBC AUTO-ENTMCNC: 33.8 G/DL (ref 31–36)
MCV RBC AUTO: 87.8 FL (ref 80–100)
MONOCYTES ABSOLUTE: 0.1 K/UL (ref 0–1.3)
MONOCYTES RELATIVE PERCENT: 5 %
NEUTROPHILS ABSOLUTE: 1 K/UL (ref 1.7–7.7)
NEUTROPHILS RELATIVE PERCENT: 37 %
PDW BLD-RTO: 14.4 % (ref 12.4–15.4)
PHOSPHORUS: 3.8 MG/DL (ref 2.5–4.9)
PLATELET # BLD: 154 K/UL (ref 135–450)
PLATELET SLIDE REVIEW: ADEQUATE
PMV BLD AUTO: 7.9 FL (ref 5–10.5)
POTASSIUM REFLEX MAGNESIUM: 4.1 MMOL/L (ref 3.5–5.1)
POTASSIUM SERPL-SCNC: 4.3 MMOL/L (ref 3.5–5.1)
RBC # BLD: 3.66 M/UL (ref 4–5.2)
RBC # BLD: NORMAL 10*6/UL
SLIDE REVIEW: ABNORMAL
SODIUM BLD-SCNC: 131 MMOL/L (ref 136–145)
SODIUM BLD-SCNC: 134 MMOL/L (ref 136–145)
WBC # BLD: 2.7 K/UL (ref 4–11)

## 2022-09-21 PROCEDURE — 80069 RENAL FUNCTION PANEL: CPT

## 2022-09-21 PROCEDURE — 99232 SBSQ HOSP IP/OBS MODERATE 35: CPT | Performed by: INTERNAL MEDICINE

## 2022-09-21 PROCEDURE — 36415 COLL VENOUS BLD VENIPUNCTURE: CPT

## 2022-09-21 PROCEDURE — 94760 N-INVAS EAR/PLS OXIMETRY 1: CPT

## 2022-09-21 PROCEDURE — 6360000002 HC RX W HCPCS: Performed by: INTERNAL MEDICINE

## 2022-09-21 PROCEDURE — 6370000000 HC RX 637 (ALT 250 FOR IP): Performed by: INTERNAL MEDICINE

## 2022-09-21 PROCEDURE — 2580000003 HC RX 258: Performed by: INTERNAL MEDICINE

## 2022-09-21 PROCEDURE — 85025 COMPLETE CBC W/AUTO DIFF WBC: CPT

## 2022-09-21 RX ORDER — CLOTRIMAZOLE 1 %
CREAM (GRAM) TOPICAL 2 TIMES DAILY
Qty: 113 G | Refills: 0 | Status: SHIPPED | OUTPATIENT
Start: 2022-09-21 | End: 2022-09-22 | Stop reason: SDUPTHER

## 2022-09-21 RX ORDER — CEPHALEXIN 500 MG/1
500 CAPSULE ORAL 3 TIMES DAILY
Qty: 42 CAPSULE | Refills: 0 | Status: SHIPPED | OUTPATIENT
Start: 2022-09-21 | End: 2022-10-05

## 2022-09-21 RX ORDER — TRIAMCINOLONE ACETONIDE 1 MG/G
CREAM TOPICAL 2 TIMES DAILY
Qty: 45 G | Refills: 0 | Status: SHIPPED | OUTPATIENT
Start: 2022-09-21 | End: 2022-10-01

## 2022-09-21 RX ADMIN — CETIRIZINE HYDROCHLORIDE 10 MG: 10 TABLET, FILM COATED ORAL at 08:14

## 2022-09-21 RX ADMIN — MUPIROCIN: 20 OINTMENT TOPICAL at 08:15

## 2022-09-21 RX ADMIN — Medication 100 MCG: at 08:14

## 2022-09-21 RX ADMIN — CEFAZOLIN 2000 MG: 2 INJECTION, POWDER, FOR SOLUTION INTRAMUSCULAR; INTRAVENOUS at 06:21

## 2022-09-21 RX ADMIN — CEFAZOLIN 2000 MG: 2 INJECTION, POWDER, FOR SOLUTION INTRAMUSCULAR; INTRAVENOUS at 14:49

## 2022-09-21 RX ADMIN — BUSPIRONE HYDROCHLORIDE 20 MG: 10 TABLET ORAL at 14:48

## 2022-09-21 RX ADMIN — PANTOPRAZOLE SODIUM 40 MG: 40 TABLET, DELAYED RELEASE ORAL at 06:21

## 2022-09-21 RX ADMIN — TRIAMCINOLONE ACETONIDE: 1 CREAM TOPICAL at 08:14

## 2022-09-21 RX ADMIN — CLOTRIMAZOLE: 10 CREAM TOPICAL at 08:15

## 2022-09-21 RX ADMIN — SODIUM CHLORIDE, PRESERVATIVE FREE 10 ML: 5 INJECTION INTRAVENOUS at 08:31

## 2022-09-21 RX ADMIN — OXCARBAZEPINE 300 MG: 300 TABLET, FILM COATED ORAL at 08:14

## 2022-09-21 RX ADMIN — BUSPIRONE HYDROCHLORIDE 20 MG: 10 TABLET ORAL at 08:14

## 2022-09-21 ASSESSMENT — PAIN SCALES - GENERAL: PAINLEVEL_OUTOF10: 0

## 2022-09-21 NOTE — PROGRESS NOTES
Written and verbal DC instructions reviewed with pt. Pt states understanding. All questions answered. Iv removed without complications. Dressing clean dry and intact. Pt awaiting ride.     Electronically signed by Sky Branham RN on 9/21/2022 at 5:26 PM

## 2022-09-21 NOTE — PLAN OF CARE
Problem: Discharge Planning  Goal: Discharge to home or other facility with appropriate resources  9/21/2022 0749 by Mila Martinez RN  Outcome: Progressing  9/21/2022 0145 by Jaziel Tam RN  Outcome: Progressing     Problem: Pain  Goal: Verbalizes/displays adequate comfort level or baseline comfort level  9/21/2022 0749 by Mila Martinez RN  Outcome: Progressing  9/21/2022 0145 by Jaziel Tam RN  Outcome: Progressing     Problem: Safety - Adult  Goal: Free from fall injury  9/21/2022 0749 by Mila Martinez RN  Outcome: Progressing  9/21/2022 0145 by Jaziel Tam RN  Outcome: Progressing     Problem: ABCDS Injury Assessment  Goal: Absence of physical injury  Outcome: Progressing

## 2022-09-21 NOTE — PROGRESS NOTES
Infectious Disease Follow up Notes  Admit Date: 9/19/2022  Hospital Day: 3    Antibiotics :   IV Cefazolin  Clotrimazole     CHIEF COMPLAINT:       \Rt leg cellulitIS  Rt foot infection   Tenia pedis        Subjective interval History :  32 y. o.woman with Psoriatic arthritis on Humira, Rt foot Tinea pedis on Lamisil recently has on going Leukopenia since been on Humira for te past > 1yr per patient was seen by Podiatry as out pt and wound cx Rt foot with MSSA + was given Oral Bactrim and Rt foot cellulitis slow to resolve and now admitted with worsening symptoms. She works as  and feet get sweaty. Location : Rt foot pain, swelling redness      Quality :  aching      Severity : 8/10    Duration :   2 weeks   Timing : intermittent   Context :  Rt foot Tinea pedis,, Psoriatic arthritis    Modifying factors :  Associated signs and symptoms:          Interval History : Rt foot doing better no chills and Rt leg swelling going down and wound cx repeat negative        Past Medical History:    Past Medical History:   Diagnosis Date    Anxiety     Arthritis 2015    psoariatic    Depression     Former smoker 5-1-12    GERD (gastroesophageal reflux disease)     Migraine     Onychomycosis        Past Surgical History:    Past Surgical History:   Procedure Laterality Date    COSMETIC SURGERY      Bilateral Ears    WISDOM TOOTH EXTRACTION         Current Medications:    Outpatient Medications Marked as Taking for the 9/19/22 encounter [facility] Encounter)   Medication Sig Dispense Refill    clotrimazole (LOTRIMIN) 1 % cream Apply topically 2 times daily for 21 days Apply topically 2 times daily. 113 g 0    triamcinolone (KENALOG) 0.1 % cream Apply topically 2 times daily for 10 days Apply topically 2 times daily.  45 g 0    adalimumab (HUMIRA) 40 MG/0.8ML injection Inject 40 mg into the skin every 14 days      OXcarbazepine (TRILEPTAL) 300 MG tablet Take 300 mg by mouth every morning      OXcarbazepine (TRILEPTAL) 300 MG tablet Take 1,200 mg by mouth at bedtime      busPIRone (BUSPAR) 10 MG tablet Take 20 mg by mouth 3 times daily      fexofenadine (ALLEGRA) 180 MG tablet Take 180 mg by mouth daily         Allergies:  Diflucan [fluconazole]    Immunizations :   Immunization History   Administered Date(s) Administered    COVID-19, PFIZER PURPLE top, DILUTE for use, (age 15 y+), 30mcg/0.3mL 05/07/2021, 06/04/2021       Social History:    Social History     Tobacco Use    Smoking status: Former     Packs/day: 0.25     Types: Cigarettes     Quit date: 5/1/2012     Years since quitting: 10.3    Smokeless tobacco: Never    Tobacco comments:     discussed stopping  does not smoke daily   Substance Use Topics    Alcohol use:  Yes     Alcohol/week: 0.0 standard drinks     Comment: Occasional    Drug use: No     Social History     Tobacco Use   Smoking Status Former    Packs/day: 0.25    Types: Cigarettes    Quit date: 5/1/2012    Years since quitting: 10.3   Smokeless Tobacco Never   Tobacco Comments    discussed stopping  does not smoke daily      Family History   Problem Relation Age of Onset    High Blood Pressure Mother     Asthma Brother     Stroke Paternal Uncle     Cancer Maternal Grandmother     Cancer Maternal Grandfather     Diabetes Paternal Grandfather          REVIEW OF SYSTEMS:     Constitutional:  negative for fevers, chills, night sweats  Eyes:  negative for blurred vision, eye discharge, visual disturbance   HEENT:  negative for hearing loss, ear drainage,nasal congestion  Respiratory:  negative for cough, shortness of breath or hemoptysis   Cardiovascular:  negative for chest pain, palpitations, syncope  Gastrointestinal:  negative for nausea, vomiting, diarrhea, constipation, abdominal pain  Genitourinary:  negative for frequency, dysuria, urinary incontinence, hematuria  Hematologic/Lymphatic:  negative for easy bruising, bleeding and lymphadenopathy  Allergic/Immunologic:  negative for recurrent infections, angioedema, anaphylaxis   Endocrine:  negative for weight changes, polyuria, polydipsia and polyphagia  Musculoskeletal:  Rt leg   pain+ , swelling+ , decreased range of motion  Integumentary: No rashes, skin lesions  Neurological:  negative for headaches, slurred speech, unilateral weakness  Psychiatric: negative for hallucinations,confusion,agitation.                 PHYSICAL EXAM:      Vitals:    /84   Pulse 73   Temp 97.7 °F (36.5 °C) (Oral)   Resp 18   Ht 5' 8\" (1.727 m)   Wt 196 lb 10.4 oz (89.2 kg)   LMP 08/16/2022 (Approximate)   SpO2 100%   BMI 29.90 kg/m²     General Appearance: alert,in some  acute distress, ++  pallor, no icterus   Skin: warm and dry, no rash or erythema  Head: normocephalic and atraumatic  Eyes: pupils equal, round, and reactive to light, conjunctivae normal  ENT: tympanic membrane, external ear and ear canal normal bilaterally, nose without deformity, nasal mucosa and turbinates normal without polyps  Neck: supple and non-tender without mass, no thyromegaly  no cervical lymphadenopathy  Pulmonary/Chest: clear to auscultation bilaterally- no wheezes, rales or rhonchi, normal air movement, no respiratory distress  Cardiovascular: normal rate, regular rhythm, normal S1 and S2, no murmurs, rubs, clicks, or gallops, no carotid bruits  Abdomen: soft, non-tender, non-distended, normal bowel sounds, no masses or organomegaly  Extremities: no cyanosis, clubbing or edema  Musculoskeletal: normal range of motion, no joint swelling, deformity or tenderness  Integumentary: No rashes, no abnormal skin lesions, no petechiae  Neurologic: reflexes normal and symmetric, no cranial nerve deficit  Psych:  Orientation, sensorium, mood normal            Lines: IV  Rt foot dorsum scaly skin and Tinea pedis on going cellulitis lower 1/3 of the legs -   Psoriatic plaques  -        Data Review:    CBC:   Lab Results Component Value Date    WBC 2.7 (L) 09/21/2022    HGB 10.9 (L) 09/21/2022    HCT 32.2 (L) 09/21/2022    MCV 87.8 09/21/2022     09/21/2022     RENAL:   Lab Results   Component Value Date    CREATININE 0.6 09/21/2022    CREATININE 0.6 09/21/2022    BUN 8 09/21/2022    BUN 8 09/21/2022     (L) 09/21/2022     (L) 09/21/2022    K 4.1 09/21/2022    K 4.3 09/21/2022    CL 96 (L) 09/21/2022    CL 99 09/21/2022    CO2 24 09/21/2022    CO2 24 09/21/2022     SED RATE:   Lab Results   Component Value Date/Time    SEDRATE 15 02/18/2016 11:57 AM     CK: No results found for: CKTOTAL  CRP:   Lab Results   Component Value Date/Time    CRP 3.3 02/18/2016 11:57 AM     Hepatic Function Panel:   Lab Results   Component Value Date/Time    ALKPHOS 56 06/18/2021 01:56 PM    ALT 18 06/18/2021 01:56 PM    AST 25 06/18/2021 01:56 PM    PROT 7.1 06/18/2021 01:56 PM    PROT 6.9 07/19/2012 03:46 PM    BILITOT 0.4 06/18/2021 01:56 PM    BILIDIR 0.10 07/19/2012 03:46 PM    IBILI 0.2 07/19/2012 03:46 PM    LABALBU 3.5 09/21/2022 06:08 AM     UA:  Lab Results   Component Value Date/Time    COLORU YELLOW 04/26/2016 10:38 AM    CLARITYU Clear 04/26/2016 10:38 AM    GLUCOSEU Negative 04/26/2016 10:38 AM    BILIRUBINUR Negative 04/26/2016 10:38 AM    KETUA Negative 04/26/2016 10:38 AM    SPECGRAV 1.018 04/26/2016 10:38 AM    BLOODU Negative 04/26/2016 10:38 AM    PHUR 6.5 04/26/2016 10:38 AM    PROTEINU Negative 04/26/2016 10:38 AM    UROBILINOGEN 0.2 04/26/2016 10:38 AM    NITRU Negative 04/26/2016 10:38 AM    LEUKOCYTESUR Negative 04/26/2016 10:38 AM    LABMICR Not Indicated 04/26/2016 10:38 AM    URINETYPE Voided 04/26/2016 10:38 AM      Urine Microscopic: No results found for: LABCAST, BACTERIA, COMU, HYALCAST, WBCUA, RBCUA, EPIU  Urine Reflex to Culture:   Lab Results   Component Value Date/Time    URRFLXCULT Not Indicated 04/26/2016 10:38 AM         MICRO: cultures reviewed and updated by me   Blood Culture:     Resulting AFBSMEAR  Viral Culture:  No results found for: COVID19  Urine Culture: No results for input(s): LABURIN in the last 72 hours. IMAGING:    XR FOOT RIGHT (MIN 3 VIEWS)   Final Result      No acute findings in the right foot.                All the pertinent images and reports for the current Hospitalization were reviewed by me     Scheduled Meds:   clotrimazole   Topical BID    ceFAZolin  2,000 mg IntraVENous Q8H    triamcinolone   Topical BID    mupirocin   Topical Daily    ketorolac  15 mg IntraVENous Once    sodium chloride flush  10 mL IntraVENous 2 times per day    enoxaparin  40 mg SubCUTAneous Daily    busPIRone  20 mg Oral TID    cyanocobalamin  100 mcg Oral Daily    pantoprazole  40 mg Oral QAM AC    OXcarbazepine  300 mg Oral QAM    OXcarbazepine  1,200 mg Oral Nightly    cetirizine  10 mg Oral Daily       Continuous Infusions:   sodium chloride 25 mL (09/20/22 2018)       PRN Meds:  sodium chloride flush, sodium chloride, potassium chloride **OR** potassium alternative oral replacement **OR** [DISCONTINUED] potassium chloride, potassium chloride, magnesium sulfate, promethazine **OR** ondansetron, magnesium hydroxide, acetaminophen **OR** acetaminophen, diphenhydrAMINE      Assessment:     Patient Active Problem List   Diagnosis    Depression    Anxiety    Migraine    GERD (gastroesophageal reflux disease)    Psoriatic arthritis ([facility] [address]    Psoriasis    Chronic midline low back pain without sciatica    Contact dermatitis and other eczema, due to unspecified cause    HPV (human papilloma virus) infection    PTSD (post-traumatic stress disorder)    Rash    Cellulitis    Failure of outpatient treatment    MSSA (methicillin susceptible Staphylococcus aureus) infection    Adalimumab (Humira) long-term use    Tinea pedis of right foot    Leucopenia     Rt foot and Leg cellulitis  Rt foot skin changes  Tinea pedis+  Leukopenia  ON Humira for Psoriatic arthritis  Depression  Anxiety   Wound cx MSSA  Venous duplex -ve     Tinea pedis appears to be the portal of entry for the cellulitis and immune suppressed from Humira will cont IV abx as in patient and choose oral abx when ready    Rt foot and swelling better we can choose oral ab for d/c planning       Labs, Microbiology, Radiology and all the pertinent results from current hospitalization and  care every where were reviewed  by me as a part of the evaluation   Plan:     IV Cefazolin x 2 gm   8 HRS as in patient   Clotrimazole topical  twice a day x 14 days  Elevate the leg  Home on oral abx   May be she can hold on Humira for 1 week advised her to d/w her Rheumatologist   Oral Cephalexin x 500 mg Q 8 HR X 14 days      Discussed with patient/Family and Nursing   Risk of Complications/Morbidity: High      Illness(es)/ Infection present that pose threat to bodily function. There is potential for severe exacerbation of infection/side effects of treatment. Therapy requires intensive monitoring for antimicrobial agent toxicity    Discussed with patient/Family and Nursing staff     Thanks for allowing me to participate in your patient's care and please call me with any questions or concerns.     Onesimo Ramos MD  Infectious Disease  [facility]) Physician  Phone: [phone]   Fax : [phone]

## 2022-09-21 NOTE — PROGRESS NOTES
Discharge summary  Patient:  Trina Shetty  Unit/Bed:M3N-6595/4259-01   YOB: 1995       MRN: [id number] Acct: [de-identified]  PCP: Noe Donaldson MD    Date of Admission: 9/19/2022  --------------------------  Discharge Date: 9/21/2022       Admitting Physician: Simran Valentino MD     Discharge Physician: Petrona Tabor MD       Consults:     PHARMACY TO DOSE VANCOMYCIN  PHARMACY TO DOSE VANCOMYCIN  IP CONSULT TO PODIATRY  IP CONSULT TO INFECTIOUS DISEASES    Disposition: Home    Condition at Discharge: Stable    Code Status:  Prior           Patient Instructions:    Discharge lab/important testing/finding that need follow up : Outpatient follow-up with rheumatology, outpatient follow-up with podiatry    Activity: activity as tolerated    Diet: No diet orders on file      Follow-up visits:   Noe Donaldson MD  [place] [id number] [place] [address]  [phone]               Discharge Medications:     Discharge Medication List as of 9/21/2022  5:07 PM        START taking these medications    Details   triamcinolone (KENALOG) 0.1 % cream Apply topically 2 times daily for 10 days Apply topically 2 times daily. , Topical, 2 TIMES DAILY Starting Wed 9/21/2022, Until Sat 10/1/2022, For 10 days, Disp-45 g, R-0, Normal      cephALEXin (KEFLEX) 500 MG capsule Take 1 capsule by mouth 3 times daily for 14 days, Disp-42 capsule, R-0Print      clotrimazole (LOTRIMIN) 1 % cream Apply topically 2 times daily for 21 days Apply topically 2 times daily. , Topical, 2 TIMES DAILY Starting Wed 9/21/2022, Until Wed 10/12/2022, For 21 days, Disp-113 g, R-0, Normal           Discharge Medication List as of 9/21/2022  5:07 PM        Discharge Medication List as of 9/21/2022  5:07 PM        CONTINUE these medications which have NOT CHANGED    Details   adalimumab (HUMIRA) 40 MG/0.8ML injection Inject 40 mg into the skin every 14 daysHistorical Med      !! OXcarbazepine (TRILEPTAL) 300 MG tablet Take 300 mg by mouth every morningHistorical Med      !! OXcarbazepine (TRILEPTAL) 300 MG tablet Take 1,200 mg by mouth at bedtimeHistorical Med      busPIRone (BUSPAR) 10 MG tablet Take 20 mg by mouth 3 times dailyHistorical Med      fexofenadine (ALLEGRA) 180 MG tablet Take 180 mg by mouth dailyHistorical Med      omeprazole (PRILOSEC) 20 MG delayed release capsule Take 20 mg by mouth dailyHistorical Med      cyanocobalamin 100 MCG tablet Take 1 tablet by mouth dailyHistorical Med      Coenzyme Q10 (COQ-10) 100 MG CAPS Take 100 mg by mouth dailyHistorical Med       !! - Potential duplicate medications found. Please discuss with provider. Discharge Medication List as of 9/21/2022  5:07 PM        STOP taking these medications       norgestrel-ethinyl estradiol (LOW-OGESTREL) 0.3-30 MG-MCG per tablet Comments:   Reason for Stopping:         Adalimumab (HUMIRA PEN) 40 MG/0.4ML PNKT Comments:   Reason for Stopping:                  Patient reached the maximum benefit of this hospitalization. Patient  was hemodynamically stable on discharge   Available consultant are in agreement with discharge planning. Patient symptoms was controlled and in agreement with discharge planning. Time Spent on discharge is 35 minutes in the examination, evaluation, counseling and review of medications and discharge plan   Chief Complaint:     Right lower extremity pain, tenderness and swelling    Hospital Course:     Celina Salazar is a 32 y.o. female hospitalized on 9/19/2022   right lower extremity redness          Right lower extremity cellulitis without abscess, the setting of immunosuppression failed outpatient antibiotic therapy  Broad-spectrum antibiotic initiated, evaluated by ID team, antibiotics tailored to cefazolin, discharged on Keflex. Topical cream per podiatry  Encouraged outpatient follow-up with podiatry as an outpatient. She is to discuss with her rheumatologist the use of Humira.     Psoriasis, no well-controlled with Humira, may need to hold, patient instructed to discuss that with her neurologist    Mild hyponatremia, stable    Leukopenia, stable           ----------------      Subjective:     Patient seen and examined  Overnight events noted  RN and ancillary staff note reviewed  Doing well, eager for discharge, she has no complaint    Diet: ADULT DIET; Regular    OBJECTIVE     Exam:  /84   Pulse 73   Temp 97.7 °F (36.5 °C) (Oral)   Resp 18   Ht 5' 8\" (1.727 m)   Wt 196 lb 10.4 oz (89.2 kg)   LMP 08/16/2022 (Approximate)   SpO2 100%   BMI 29.90 kg/m²            Gen: Not in distress. Alert. Head: Normocephalic. Atraumatic. Eyes: Conjunctivae/corneas clear. ENT: Oral mucosa moist  Neck: No JVD. No obvious thyromegaly. CVS: Nml S1S2, no murmur  , RRR  Pulmomary: Clear bilaterally. No crackles. No wheezes. Gastrointestinal: Soft, non tender, non distend, . Musculoskeletal: Overall improved erythema in the right lower extremity  Neuro: No focal deficit. Moves extremity spontaneously. Psychiatry: Appropriate affect. Not agitated.         Medications:  Reviewed    Infusion Medications    sodium chloride 25 mL (09/20/22 2018)     Scheduled Medications    clotrimazole   Topical BID    ceFAZolin  2,000 mg IntraVENous Q8H    triamcinolone   Topical BID    mupirocin   Topical Daily    ketorolac  15 mg IntraVENous Once    sodium chloride flush  10 mL IntraVENous 2 times per day    enoxaparin  40 mg SubCUTAneous Daily    busPIRone  20 mg Oral TID    cyanocobalamin  100 mcg Oral Daily    pantoprazole  40 mg Oral QAM AC    OXcarbazepine  300 mg Oral QAM    OXcarbazepine  1,200 mg Oral Nightly    cetirizine  10 mg Oral Daily     PRN Meds: sodium chloride flush, sodium chloride, potassium chloride **OR** potassium alternative oral replacement **OR** [DISCONTINUED] potassium chloride, potassium chloride, magnesium sulfate, promethazine **OR** ondansetron, magnesium hydroxide, acetaminophen **OR** acetaminophen, diphenhydrAMINE      Intake/Output Summary (Last 24 hours) at 9/21/2022 1303  Last data filed at 9/20/2022 2018  Gross per 24 hour   Intake 600 ml   Output --   Net 600 ml             Labs:   Recent Labs     09/19/22  1522 09/20/22  0655 09/21/22  0608   WBC 2.8* 2.8* 2.7*   HGB 13.0 11.5* 10.9*   HCT 38.1 34.1* 32.2*    161 154       Recent Labs     09/19/22  1522 09/20/22  0655 09/21/22  0608   * 134* 134*  131*   K 4.1 4.7 4.3  4.1   CL 98* 103 99  96*   CO2 24 24 24  24   BUN 6* 6* 8  8   CREATININE 0.6 0.7 0.6  0.6   CALCIUM 8.6 8.3 8.2*  8.2*   PHOS  --   --  3.8       No results for input(s): AST, ALT, BILIDIR, BILITOT, ALKPHOS in the last 72 hours. No results for input(s): INR in the last 72 hours. No results for input(s): Jules Pears in the last 72 hours. Urinalysis:      Lab Results   Component Value Date/Time    NITRU Negative 04/26/2016 10:38 AM    BLOODU Negative 04/26/2016 10:38 AM    SPECGRAV 1.018 04/26/2016 10:38 AM    GLUCOSEU Negative 04/26/2016 10:38 AM       Radiology:  XR FOOT RIGHT (MIN 3 VIEWS)   Final Result      No acute findings in the right foot.                      Electronically signed by Florian Koehler MD on 9/21/2022 at 1:03 PM

## 2022-09-21 NOTE — DISCHARGE INSTR - DIET

## 2022-09-22 RX ORDER — CLOTRIMAZOLE 1 %
CREAM (GRAM) TOPICAL 2 TIMES DAILY
Qty: 113 G | Refills: 0 | Status: SHIPPED | OUTPATIENT
Start: 2022-09-22 | End: 2022-10-13

## 2022-09-22 NOTE — DISCHARGE SUMMARY
Discharge summary  Patient:  Pamela Gonzlaez  Unit/Bed:G2Z-9003/4259-01   YOB: 1995       MRN: [id number] Acct: [de-identified]  PCP: Marcelino Alexander MD    Date of Admission: 9/19/2022  --------------------------  Discharge Date: 9/21/2022       Admitting Physician: Je Kerns MD     Discharge Physician: Kailee Johnson MD       Consults:     PHARMACY TO DOSE VANCOMYCIN  PHARMACY TO DOSE VANCOMYCIN  IP CONSULT TO PODIATRY  IP CONSULT TO INFECTIOUS DISEASES    Disposition: Home    Condition at Discharge: Stable    Code Status:  Prior           Patient Instructions:    Discharge lab/important testing/finding that need follow up : Outpatient follow-up with rheumatology, outpatient follow-up with podiatry    Activity: activity as tolerated    Diet: No diet orders on file      Follow-up visits:   Marcelino Alexander MD  [address]  [phone]             Discharge Medications:     Discharge Medication List as of 9/21/2022  5:07 PM        START taking these medications    Details   triamcinolone (KENALOG) 0.1 % cream Apply topically 2 times daily for 10 days Apply topically 2 times daily. , Topical, 2 TIMES DAILY Starting Wed 9/21/2022, Until Sat 10/1/2022, For 10 days, Disp-45 g, R-0, Normal      cephALEXin (KEFLEX) 500 MG capsule Take 1 capsule by mouth 3 times daily for 14 days, Disp-42 capsule, R-0Print      clotrimazole (LOTRIMIN) 1 % cream Apply topically 2 times daily for 21 days Apply topically 2 times daily. , Topical, 2 TIMES DAILY Starting Wed 9/21/2022, Until Wed 10/12/2022, For 21 days, Disp-113 g, R-0, Normal           Discharge Medication List as of 9/21/2022  5:07 PM        Discharge Medication List as of 9/21/2022  5:07 PM        CONTINUE these medications which have NOT CHANGED    Details   adalimumab (HUMIRA) 40 MG/0.8ML injection Inject 40 mg into the skin every 14 daysHistorical Med      !! OXcarbazepine (TRILEPTAL) 300 MG tablet Take 300 mg by mouth every morningHistorical Med      !! OXcarbazepine (TRILEPTAL) 300 MG tablet Take 1,200 mg by mouth at bedtimeHistorical Med      busPIRone (BUSPAR) 10 MG tablet Take 20 mg by mouth 3 times dailyHistorical Med      fexofenadine (ALLEGRA) 180 MG tablet Take 180 mg by mouth dailyHistorical Med      omeprazole (PRILOSEC) 20 MG delayed release capsule Take 20 mg by mouth dailyHistorical Med      cyanocobalamin 100 MCG tablet Take 1 tablet by mouth dailyHistorical Med      Coenzyme Q10 (COQ-10) 100 MG CAPS Take 100 mg by mouth dailyHistorical Med       !! - Potential duplicate medications found. Please discuss with provider. Discharge Medication List as of 9/21/2022  5:07 PM        STOP taking these medications       norgestrel-ethinyl estradiol (LOW-OGESTREL) 0.3-30 MG-MCG per tablet Comments:   Reason for Stopping:         Adalimumab (HUMIRA PEN) 40 MG/0.4ML PNKT Comments:   Reason for Stopping:                  Patient reached the maximum benefit of this hospitalization. Patient  was hemodynamically stable on discharge   Available consultant are in agreement with discharge planning. Patient symptoms was controlled and in agreement with discharge planning. Time Spent on discharge is 35 minutes in the examination, evaluation, counseling and review of medications and discharge plan   Chief Complaint:     Right lower extremity pain, tenderness and swelling    Hospital Course:     Jaky Orellana is a 32 y.o. female hospitalized on 9/19/2022   right lower extremity redness          Right lower extremity cellulitis without abscess, the setting of immunosuppression failed outpatient antibiotic therapy  Broad-spectrum antibiotic initiated, evaluated by ID team, antibiotics tailored to cefazolin, discharged on Keflex. Topical cream per podiatry  Encouraged outpatient follow-up with podiatry as an outpatient. She is to discuss with her rheumatologist the use of Humira.     Psoriasis, no well-controlled with Humira, may need to hold, patient instructed to discuss that with her neurologist    Mild hyponatremia, stable    Leukopenia, stable           ----------------      Subjective:     Patient seen and examined  Overnight events noted  RN and ancillary staff note reviewed  Doing well, eager for discharge, she has no complaint    Diet: No diet orders on file    OBJECTIVE     Exam:  /79   Pulse 80   Temp 97.8 °F (36.6 °C) (Oral)   Resp 20   Ht 5' 8\" (1.727 m)   Wt 196 lb 10.4 oz (89.2 kg)   LMP 08/16/2022 (Approximate)   SpO2 100%   BMI 29.90 kg/m²            Gen: Not in distress. Alert. Head: Normocephalic. Atraumatic. Eyes: Conjunctivae/corneas clear. ENT: Oral mucosa moist  Neck: No JVD. No obvious thyromegaly. CVS: Nml S1S2, no murmur  , RRR  Pulmomary: Clear bilaterally. No crackles. No wheezes. Gastrointestinal: Soft, non tender, non distend, . Musculoskeletal: Overall improved erythema in the right lower extremity  Neuro: No focal deficit. Moves extremity spontaneously. Psychiatry: Appropriate affect. Not agitated. Medications:  Reviewed    Infusion Medications   REM    Scheduled Medications   REM    PRN Meds: REM    No intake or output data in the 24 hours ending 09/22/22 1514            Labs:   Recent Labs     09/19/22  1522 09/20/22  0655 09/21/22  0608   WBC 2.8* 2.8* 2.7*   HGB 13.0 11.5* 10.9*   HCT 38.1 34.1* 32.2*    161 154       Recent Labs     09/19/22  1522 09/20/22  0655 09/21/22  0608   * 134* 134*  131*   K 4.1 4.7 4.3  4.1   CL 98* 103 99  96*   CO2 24 24 24  24   BUN 6* 6* 8  8   CREATININE 0.6 0.7 0.6  0.6   CALCIUM 8.6 8.3 8.2*  8.2*   PHOS  --   --  3.8       No results for input(s): AST, ALT, BILIDIR, BILITOT, ALKPHOS in the last 72 hours. No results for input(s): INR in the last 72 hours. No results for input(s): [facility] in the last 72 hours.     Urinalysis:      Lab Results   Component Value Date/Time    NITRU Negative 04/26/2016 10:38 AM BLOODU Negative 04/26/2016 10:38 AM    SPECGRAV 1.018 04/26/2016 10:38 AM    GLUCOSEU Negative 04/26/2016 10:38 AM       Radiology:  XR FOOT RIGHT (MIN 3 VIEWS)   Final Result      No acute findings in the right foot.                      Electronically signed by Rebekah Steele MD on 9/22/2022 at 3:14 PM

## 2022-09-23 LAB
BLOOD CULTURE, ROUTINE: NORMAL
CULTURE, BLOOD 2: NORMAL
GRAM STAIN RESULT: NORMAL
WOUND/ABSCESS: NORMAL

## 2023-07-27 NOTE — PROGRESS NOTES
Hospitalist Progress Note    Patient:  Venus Magana  Unit/Bed:N6R-0727/4259-01   YOB: 1995       MRN: [id number] Acct: [de-identified]  PCP: Rupesh Neville MD    Date of Admission: 9/19/2022  --------------------------    Chief Complaint:     Right lower extremity pain, tenderness and swelling    Hospital Course:     Venus Magana is a 32 y.o. female hospitalized on 9/19/2022   right lower extremity redness  Assessment/plan:        Right lower extremity cellulitis without abscess, the setting of immunosuppression failed outpatient antibiotic therapy    -Discontinue IV vancomycin, Zyvox will be but the patient has leukopenia we will start Ceftin and doxycycline and evaluate the a.m.  -    Psoriasis, no well-controlled with Humira    Mild hyponatremia, stable    Leukopenia, ANC 1200. Code Status: Full Code         DVT prophylaxis: Lovenox     Disposition: For discharge in the a.m. I discussed my thought processes at length with patient/family and patient understood. Question and concerns  Addressed      Discussed with RN       ----------------      Subjective:     Patient seen and examined  Overnight events noted  RN and ancillary staff note reviewed    Patient reports redness and tenderness in the right lower extremity improved. No fever. Diet: ADULT DIET; Regular    OBJECTIVE     Exam:  /75   Pulse 87   Temp 97.4 °F (36.3 °C) (Oral)   Resp 16   Ht 5' 8\" (1.727 m)   Wt 194 lb 0.1 oz (88 kg)   LMP 08/16/2022 (Approximate)   SpO2 100%   BMI 29.50 kg/m²            Gen: Not in distress. Alert. Head: Normocephalic. Atraumatic. Eyes: Conjunctivae/corneas clear. ENT: Oral mucosa moist  Neck: No JVD. No obvious thyromegaly. CVS: Nml S1S2, no murmur  , RRR  Pulmomary: Clear bilaterally. No crackles. No wheezes. Gastrointestinal: Soft, non tender, non distend, . Musculoskeletal: Fading redness in the right lower extremity, no tenderness. Neuro: No focal deficit.  Moves Patient HR: 148 extremity spontaneously. Psychiatry: Appropriate affect. Not agitated. Medications:  Reviewed    Infusion Medications    sodium chloride       Scheduled Medications    linezolid  600 mg Oral 2 times per day    ketorolac  15 mg IntraVENous Once    sodium chloride flush  10 mL IntraVENous 2 times per day    enoxaparin  40 mg SubCUTAneous Daily    busPIRone  20 mg Oral TID    cyanocobalamin  100 mcg Oral Daily    pantoprazole  40 mg Oral QAM AC    OXcarbazepine  300 mg Oral QAM    OXcarbazepine  1,200 mg Oral Nightly    cetirizine  10 mg Oral Daily     PRN Meds: sodium chloride flush, sodium chloride, potassium chloride **OR** potassium alternative oral replacement **OR** [DISCONTINUED] potassium chloride, potassium chloride, magnesium sulfate, promethazine **OR** ondansetron, magnesium hydroxide, acetaminophen **OR** acetaminophen, diphenhydrAMINE    No intake or output data in the 24 hours ending 09/20/22 1426          Labs:   Recent Labs     09/19/22  1522 09/20/22  0655   WBC 2.8* 2.8*   HGB 13.0 11.5*   HCT 38.1 34.1*    161     Recent Labs     09/19/22  1522 09/20/22  0655   * 134*   K 4.1 4.7   CL 98* 103   CO2 24 24   BUN 6* 6*   CREATININE 0.6 0.7   CALCIUM 8.6 8.3     No results for input(s): AST, ALT, BILIDIR, BILITOT, ALKPHOS in the last 72 hours. No results for input(s): INR in the last 72 hours. No results for input(s): Meredith Kras in the last 72 hours. Urinalysis:      Lab Results   Component Value Date/Time    NITRU Negative 04/26/2016 10:38 AM    BLOODU Negative 04/26/2016 10:38 AM    SPECGRAV 1.018 04/26/2016 10:38 AM    GLUCOSEU Negative 04/26/2016 10:38 AM       Radiology:  XR FOOT RIGHT (MIN 3 VIEWS)   Final Result      No acute findings in the right foot.                      Electronically signed by Juan Chau MD on 9/20/2022 at 2:26 PM